# Patient Record
Sex: FEMALE | Race: WHITE | Employment: OTHER | URBAN - METROPOLITAN AREA
[De-identification: names, ages, dates, MRNs, and addresses within clinical notes are randomized per-mention and may not be internally consistent; named-entity substitution may affect disease eponyms.]

---

## 2019-09-06 ENCOUNTER — TELEPHONE (OUTPATIENT)
Dept: INTERNAL MEDICINE CLINIC | Facility: CLINIC | Age: 79
End: 2019-09-06

## 2019-09-06 PROBLEM — F41.9 ANXIETY: Status: ACTIVE | Noted: 2019-09-06

## 2019-09-06 PROBLEM — I10 ESSENTIAL HYPERTENSION: Status: ACTIVE | Noted: 2019-09-06

## 2019-09-06 PROBLEM — E78.00 HYPERCHOLESTEROLEMIA: Status: ACTIVE | Noted: 2019-09-06

## 2019-09-06 NOTE — TELEPHONE ENCOUNTER
Request for previous medical records hippa completed and mailed:    Dr Jayna Tavarez [address][id number]  [address].  [address]

## 2019-09-06 NOTE — PROGRESS NOTES
HPI:    Patient ID: Lila Humphreys is a 70year old female. HPI she is here to establish care  With new physician. According to  Her , her blood pressure lately is high.   According to her, her   was diagnosed in May with B-cell lymphoma and [facility] Oral Tab  Disp:  Rfl:    hydrochlorothiazide 12.5 MG Oral Tab  Disp:  Rfl:    ezetimibe 10 MG Oral Tab  Disp:  Rfl:      Allergies:Not on File    HISTORY:  No past medical history on file. No past surgical history on file.    No family wheezes. She has no rales. She exhibits no tenderness. Abdominal: Soft. Bowel sounds are normal. She exhibits no shifting dullness, no distension and no mass. There is no hepatosplenomegaly. There is no tenderness.  There is no rigidity, no rebound, no gu

## 2019-09-06 NOTE — PATIENT INSTRUCTIONS
Hypertension not well controlled will continue with losartan increase HCTZ to 25 mg daily check blood pressure at home and call with blood pressure readings on Tuesday nurse visit on Friday     anxiety /grief -started on a low-dose of citalopram and referr

## 2019-09-06 NOTE — TELEPHONE ENCOUNTER
Pt's daughter placed pt on the phone. Verified pt name and . Pt states she is very stressed out now due to spouse's health. Pt crying a lot, worrying a lot, trouble sleeping.     Appt scheduled today with Dr. Mendel Jacobus in [facility] as pt asking for

## 2019-09-10 ENCOUNTER — TELEPHONE (OUTPATIENT)
Dept: INTERNAL MEDICINE CLINIC | Facility: CLINIC | Age: 79
End: 2019-09-10

## 2019-09-10 NOTE — TELEPHONE ENCOUNTER
Per your request, patient calling with her bp readings:    9/7 1:30 pm      146/76  HR 72  9/8 2:00 pm      145/77 HR 87  9/9 1:50 pm 131/71 HR 83  9/10 11:30 am 133/73 HR 80

## 2019-09-10 NOTE — TELEPHONE ENCOUNTER
Patient contacted, appt made, patient will bring her machine with her for verification of the readings

## 2019-09-10 NOTE — TELEPHONE ENCOUNTER
Is better controlled and will suggest her to continue with her hydrochlorothiazide 25 mg daily check blood pressure once a day and make sure she comes for a nurse visit on Friday to check blood pressure at the office.

## 2019-09-13 ENCOUNTER — NURSE ONLY (OUTPATIENT)
Dept: INTERNAL MEDICINE CLINIC | Facility: CLINIC | Age: 79
End: 2019-09-13
Payer: MEDICARE

## 2019-09-13 VITALS — DIASTOLIC BLOOD PRESSURE: 78 MMHG | HEART RATE: 66 BPM | SYSTOLIC BLOOD PRESSURE: 128 MMHG

## 2019-09-13 DIAGNOSIS — Z01.30 BLOOD PRESSURE CHECK: Primary | ICD-10-CM

## 2019-09-19 ENCOUNTER — TELEPHONE (OUTPATIENT)
Dept: INTERNAL MEDICINE CLINIC | Facility: CLINIC | Age: 79
End: 2019-09-19

## 2019-09-19 NOTE — TELEPHONE ENCOUNTER
Patient states not happy with Escitalopram as it has caused dry mouth and \"I just want to cry, I can't handle this medication, and I want to go back to 12.5 mg of Hydrochlorothiazide because 25 mg is too much and I'm always going to the bathroom. \"    Please advise.

## 2019-09-19 NOTE — TELEPHONE ENCOUNTER
If her blood pressure is high , she needs hctz 25 or we can add something else. If she doesn't want to take citalopram she can stop the medication and  I will refer her to [address] [place] [address] if she agrees .  Is she willing to try different medication? pls let me know

## 2019-09-19 NOTE — TELEPHONE ENCOUNTER
Spoke with the patient and informed her of Dr. Ziyad Urrutia message from below. Patient reports she does not want to stay on the HCTZ 25mg and wants to go back to the HCTZ 12.5mg. Patient also reports she will stop the citalopram. She reports she is also working on setting up an appointment to see a counselor at the institution Dr. Sandra Sage recommended. Patient is requesting to speak with Dr. Sandra Sage regarding the plan of care. Routed to provider.

## 2019-09-19 NOTE — TELEPHONE ENCOUNTER
Patient calling again regarding hctz. Agreed to now take the 25 mg dose. Advised patient to take for one week, monitor her symptoms (dry mouth main concern) and call us with readings in 7 days to determine if the symptoms are manageable. She has stopped ecitalopram and is awaiting a call back from Javi Garcia.

## 2019-09-19 NOTE — TELEPHONE ENCOUNTER
Pt stated that the medications that Dr. El Barrera prescribed her are too strong.    -hydrochlorothiazide 25 MG Oral Tab  -escitalopram 5 MG Oral Tab    Pt is experiencing dry mouth and frequent urination, which causes her not to sleep. Pt would like to speak to Triage. Transferring call to Triage.

## 2019-10-08 ENCOUNTER — OFFICE VISIT (OUTPATIENT)
Dept: INTERNAL MEDICINE CLINIC | Facility: CLINIC | Age: 79
End: 2019-10-08
Payer: MEDICARE

## 2019-10-08 VITALS
SYSTOLIC BLOOD PRESSURE: 138 MMHG | HEIGHT: 61 IN | DIASTOLIC BLOOD PRESSURE: 85 MMHG | WEIGHT: 169 LBS | RESPIRATION RATE: 18 BRPM | BODY MASS INDEX: 31.91 KG/M2 | TEMPERATURE: 98 F | HEART RATE: 74 BPM

## 2019-10-08 DIAGNOSIS — I10 HTN (HYPERTENSION), BENIGN: Primary | ICD-10-CM

## 2019-10-08 PROCEDURE — 99214 OFFICE O/P EST MOD 30 MIN: CPT | Performed by: INTERNAL MEDICINE

## 2019-10-08 PROCEDURE — G0008 ADMIN INFLUENZA VIRUS VAC: HCPCS | Performed by: INTERNAL MEDICINE

## 2019-10-08 PROCEDURE — 90662 IIV NO PRSV INCREASED AG IM: CPT | Performed by: INTERNAL MEDICINE

## 2019-10-08 NOTE — PATIENT INSTRUCTIONS
Hypertension controlled advised to continue with current medication check blood pressure at home and bring logbook next visit watch diet avoid salty food.     Anxiety -discussed how to cope with daily stress she is following with therapist we can hold off o

## 2019-10-08 NOTE — PROGRESS NOTES
HPI:    Patient ID: Lila Humphreys is a 70year old female. HPI     She is here for follow up   She is checking her blood at home and according  t o her is around 130 /80. She is taking olmesartan 40 mg daily and hctz 25 mg .  She is watching her diet and Rfl:    simvastatin 40 MG Oral Tab  Disp:  Rfl:    ezetimibe 10 MG Oral Tab  Disp:  Rfl:    hydrochlorothiazide 25 MG Oral Tab Take 1 tablet (25 mg total) by mouth daily.  Disp: 90 tablet Rfl: 0   escitalopram 5 MG Oral Tab Take 1 tablet (5 mg total) by henry friction rub. No murmur heard. Pulmonary/Chest: Effort normal and breath sounds normal. No accessory muscle usage. No respiratory distress. She has no wheezes. She has no rales. She exhibits no tenderness. Abdominal: Soft.  Bowel sounds are normal. She

## 2019-11-18 RX ORDER — OLMESARTAN MEDOXOMIL 40 MG/1
40 TABLET ORAL DAILY
Qty: 90 TABLET | Refills: 1 | Status: SHIPPED | OUTPATIENT
Start: 2019-11-18 | End: 2020-05-13

## 2019-11-18 RX ORDER — HYDROCHLOROTHIAZIDE 25 MG/1
25 TABLET ORAL DAILY
Qty: 90 TABLET | Refills: 1 | Status: SHIPPED | OUTPATIENT
Start: 2019-11-18 | End: 2020-05-20

## 2019-11-18 NOTE — TELEPHONE ENCOUNTER
Please Review;protocol failed. Olmesartan hs not been prescribed by you.  Please Advise   Hypertensive Medications  Protocol Criteria:  · Appointment scheduled in the past 6 months or in the next 3 months  · BMP or CMP in the past 12 months  · Creatinine re

## 2019-12-02 RX ORDER — ESCITALOPRAM OXALATE 5 MG/1
TABLET ORAL
Qty: 90 TABLET | Refills: 1 | Status: SHIPPED | OUTPATIENT
Start: 2019-12-02 | End: 2020-01-14

## 2020-01-14 ENCOUNTER — OFFICE VISIT (OUTPATIENT)
Dept: INTERNAL MEDICINE CLINIC | Facility: CLINIC | Age: 80
End: 2020-01-14
Payer: MEDICARE

## 2020-01-14 VITALS
SYSTOLIC BLOOD PRESSURE: 138 MMHG | DIASTOLIC BLOOD PRESSURE: 82 MMHG | BODY MASS INDEX: 31.91 KG/M2 | RESPIRATION RATE: 18 BRPM | WEIGHT: 169 LBS | HEART RATE: 82 BPM | TEMPERATURE: 99 F | HEIGHT: 61 IN

## 2020-01-14 DIAGNOSIS — I10 ESSENTIAL HYPERTENSION: Primary | ICD-10-CM

## 2020-01-14 PROCEDURE — 99213 OFFICE O/P EST LOW 20 MIN: CPT | Performed by: INTERNAL MEDICINE

## 2020-01-14 NOTE — PATIENT INSTRUCTIONS
htn - controlled , advised her to check bp at home and bring log book next visit , watch diet , avoid salty food ,    anxiety - stable - continue to follow up with therapist

## 2020-01-14 NOTE — PROGRESS NOTES
HPI:    Patient ID: Sherice Delgado is a 70year old female. HPI She is here today for follow up  On her htn    She is not checking her bp at home and home  But she is taking her medication regularly .  She states that she was not very complaint with her [facility] Dispense Refill   • ESCITALOPRAM 5 MG Oral Tab TAKE ONE TABLET BY MOUTH DAILY 90 tablet 1   • hydrochlorothiazide 25 MG Oral Tab Take 1 tablet (25 mg total) by mouth daily.  90 tablet 1   • Olmesartan Medoxomil 40 MG Oral Tab Take 1 tablet (40 mg total) by and intact distal pulses. Exam reveals no gallop and no friction rub. No murmur heard. Pulmonary/Chest: Effort normal and breath sounds normal. No accessory muscle usage. No respiratory distress. She has no wheezes. She has no rales.  She exhibits no ten

## 2020-05-13 RX ORDER — OLMESARTAN MEDOXOMIL 40 MG/1
TABLET ORAL
Qty: 90 TABLET | Refills: 0 | Status: SHIPPED | OUTPATIENT
Start: 2020-05-13 | End: 2020-08-09

## 2020-05-20 RX ORDER — HYDROCHLOROTHIAZIDE 25 MG/1
TABLET ORAL
Qty: 90 TABLET | Refills: 0 | Status: SHIPPED | OUTPATIENT
Start: 2020-05-20 | End: 2020-08-11

## 2020-08-09 RX ORDER — OLMESARTAN MEDOXOMIL 40 MG/1
TABLET ORAL
Qty: 90 TABLET | Refills: 0 | Status: SHIPPED | OUTPATIENT
Start: 2020-08-09 | End: 2020-08-11

## 2020-08-11 ENCOUNTER — OFFICE VISIT (OUTPATIENT)
Dept: INTERNAL MEDICINE CLINIC | Facility: CLINIC | Age: 80
End: 2020-08-11
Payer: MEDICARE

## 2020-08-11 VITALS
HEART RATE: 86 BPM | BODY MASS INDEX: 32.47 KG/M2 | OXYGEN SATURATION: 96 % | TEMPERATURE: 98 F | DIASTOLIC BLOOD PRESSURE: 85 MMHG | WEIGHT: 172 LBS | SYSTOLIC BLOOD PRESSURE: 138 MMHG | HEIGHT: 61 IN

## 2020-08-11 DIAGNOSIS — Z00.00 ENCOUNTER FOR ANNUAL HEALTH EXAMINATION: ICD-10-CM

## 2020-08-11 DIAGNOSIS — Z00.00 MEDICARE ANNUAL WELLNESS VISIT, SUBSEQUENT: ICD-10-CM

## 2020-08-11 DIAGNOSIS — Z78.0 MENOPAUSE: Primary | ICD-10-CM

## 2020-08-11 PROBLEM — N39.490 OVERFLOW INCONTINENCE OF URINE: Status: ACTIVE | Noted: 2020-08-11

## 2020-08-11 LAB
ALBUMIN SERPL-MCNC: 4 G/DL (ref 3.4–5)
ALBUMIN/GLOB SERPL: 1 {RATIO} (ref 1–2)
ALP LIVER SERPL-CCNC: 82 U/L (ref 55–142)
ALT SERPL-CCNC: 22 U/L (ref 13–56)
ANION GAP SERPL CALC-SCNC: 6 MMOL/L (ref 0–18)
AST SERPL-CCNC: 15 U/L (ref 15–37)
BASOPHILS # BLD AUTO: 0.05 X10(3) UL (ref 0–0.2)
BASOPHILS NFR BLD AUTO: 0.8 %
BILIRUB SERPL-MCNC: 0.4 MG/DL (ref 0.1–2)
BUN BLD-MCNC: 23 MG/DL (ref 7–18)
BUN/CREAT SERPL: 23 (ref 10–20)
CALCIUM BLD-MCNC: 9.6 MG/DL (ref 8.5–10.1)
CHLORIDE SERPL-SCNC: 106 MMOL/L (ref 98–112)
CHOLEST SMN-MCNC: 191 MG/DL (ref ?–200)
CO2 SERPL-SCNC: 28 MMOL/L (ref 21–32)
CREAT BLD-MCNC: 1 MG/DL (ref 0.55–1.02)
DEPRECATED RDW RBC AUTO: 44.9 FL (ref 35.1–46.3)
EOSINOPHIL # BLD AUTO: 0.12 X10(3) UL (ref 0–0.7)
EOSINOPHIL NFR BLD AUTO: 1.9 %
ERYTHROCYTE [DISTWIDTH] IN BLOOD BY AUTOMATED COUNT: 13.4 % (ref 11–15)
EST. AVERAGE GLUCOSE BLD GHB EST-MCNC: 117 MG/DL (ref 68–126)
GLOBULIN PLAS-MCNC: 3.9 G/DL (ref 2.8–4.4)
GLUCOSE BLD-MCNC: 85 MG/DL (ref 70–99)
HBA1C MFR BLD HPLC: 5.7 % (ref ?–5.7)
HCT VFR BLD AUTO: 41.4 % (ref 35–48)
HDLC SERPL-MCNC: 73 MG/DL (ref 40–59)
HGB BLD-MCNC: 13.2 G/DL (ref 12–16)
IMM GRANULOCYTES # BLD AUTO: 0.01 X10(3) UL (ref 0–1)
IMM GRANULOCYTES NFR BLD: 0.2 %
LDLC SERPL CALC-MCNC: 92 MG/DL (ref ?–100)
LYMPHOCYTES # BLD AUTO: 1.24 X10(3) UL (ref 1–4)
LYMPHOCYTES NFR BLD AUTO: 19.7 %
M PROTEIN MFR SERPL ELPH: 7.9 G/DL (ref 6.4–8.2)
MCH RBC QN AUTO: 28.9 PG (ref 26–34)
MCHC RBC AUTO-ENTMCNC: 31.9 G/DL (ref 31–37)
MCV RBC AUTO: 90.8 FL (ref 80–100)
MONOCYTES # BLD AUTO: 0.37 X10(3) UL (ref 0.1–1)
MONOCYTES NFR BLD AUTO: 5.9 %
NEUTROPHILS # BLD AUTO: 4.5 X10 (3) UL (ref 1.5–7.7)
NEUTROPHILS # BLD AUTO: 4.5 X10(3) UL (ref 1.5–7.7)
NEUTROPHILS NFR BLD AUTO: 71.5 %
NONHDLC SERPL-MCNC: 118 MG/DL (ref ?–130)
OSMOLALITY SERPL CALC.SUM OF ELEC: 293 MOSM/KG (ref 275–295)
PATIENT FASTING Y/N/NP: YES
PATIENT FASTING Y/N/NP: YES
PLATELET # BLD AUTO: 195 10(3)UL (ref 150–450)
POTASSIUM SERPL-SCNC: 4.1 MMOL/L (ref 3.5–5.1)
RBC # BLD AUTO: 4.56 X10(6)UL (ref 3.8–5.3)
SODIUM SERPL-SCNC: 140 MMOL/L (ref 136–145)
T4 FREE SERPL-MCNC: 1 NG/DL (ref 0.8–1.7)
TRIGL SERPL-MCNC: 128 MG/DL (ref 30–149)
TSI SER-ACNC: 2.8 MIU/ML (ref 0.36–3.74)
VLDLC SERPL CALC-MCNC: 26 MG/DL (ref 0–30)
WBC # BLD AUTO: 6.3 X10(3) UL (ref 4–11)

## 2020-08-11 PROCEDURE — G0439 PPPS, SUBSEQ VISIT: HCPCS | Performed by: INTERNAL MEDICINE

## 2020-08-11 PROCEDURE — 36415 COLL VENOUS BLD VENIPUNCTURE: CPT | Performed by: INTERNAL MEDICINE

## 2020-08-11 RX ORDER — HYDROCHLOROTHIAZIDE 25 MG/1
25 TABLET ORAL DAILY
Qty: 90 TABLET | Refills: 0 | Status: SHIPPED | OUTPATIENT
Start: 2020-08-11 | End: 2020-08-17

## 2020-08-11 RX ORDER — SIMVASTATIN 40 MG
40 TABLET ORAL NIGHTLY
Qty: 90 TABLET | Refills: 1 | Status: SHIPPED | OUTPATIENT
Start: 2020-08-11 | End: 2021-02-03

## 2020-08-11 RX ORDER — OLMESARTAN MEDOXOMIL 40 MG/1
40 TABLET ORAL DAILY
Qty: 90 TABLET | Refills: 0 | Status: SHIPPED | OUTPATIENT
Start: 2020-08-11 | End: 2021-02-01

## 2020-08-11 RX ORDER — OXYBUTYNIN CHLORIDE 5 MG/1
5 TABLET ORAL NIGHTLY
Qty: 90 TABLET | Refills: 0 | Status: SHIPPED | OUTPATIENT
Start: 2020-08-11 | End: 2020-11-06

## 2020-08-11 RX ORDER — EZETIMIBE 10 MG/1
10 TABLET ORAL NIGHTLY
Qty: 90 TABLET | Refills: 0 | Status: SHIPPED | OUTPATIENT
Start: 2020-08-11 | End: 2021-01-01

## 2020-08-11 NOTE — PROGRESS NOTES
HPI:   Simone Joy is a 68year old female who presents for a Medicare Subsequent Annual Wellness visit (Pt already had Initial Annual Wellness).       Her last annual assessment has been over 1 year: Annual Physical due on 03/15/1943         Fall/Risk Ass found for: WBC, HGB, PLT     ALLERGIES:   She has no allergies on file.     CURRENT MEDICATIONS:   Esomeprazole Magnesium 20 MG Oral Capsule Delayed Release, Take 20 mg by mouth every morning before breakfast.  OLMESARTAN MEDOXOMIL 40 MG Oral Tab, TAKE ONE following the conversations when two or more people are talking at the same time:  No   I have trouble understanding things on the TV:  No I have to strain to understand conversations:  No   I have to worry about missing the telephone ring or doorbell:  No right side and 2+ on the left side. Popliteal pulses are 2+ on the right side and 2+ on the left side. Dorsalis pedis pulses are 2+ on the right side and 2+ on the left side.         Posterior tibial pulses are 2+ on the right side and 2+ on th indicates understanding of these issues and agrees to the plan. Reinforced healthy diet, lifestyle, and exercise. No follow-ups on file.      Scotty Vilchis MD, 8/11/2020     General Health     In the past six months, have you lost more than 10 pounds w for this patient. Update Health Maintenance if applicable    Chlamydia  Annually if high risk No results found for: CHLAMYDIA No flowsheet data found.     Screening Mammogram      Mammogram  Annually to 78, then as discussed There are no preventive care rem

## 2020-08-11 NOTE — PATIENT INSTRUCTIONS
Camryn Ortiz SCREENING SCHEDULE   Tests on this list are recommended by your physician but may not be covered, or covered at this frequency, by your insurer. Please check with your insurance carrier before scheduling to verify coverage.    PREVENTATIVE Covered every 5 years No results found for this or any previous visit. No flowsheet data found. Fecal Occult Blood   Covered Annually No results found for: FOB, OCCULTSTOOL No flowsheet data found.      Barium Enema-   uncomfortable but covered  Covered get once after your 65th birthday    Hepatitis B for Moderate/High Risk       No orders found for this or any previous visit.  Medium/high risk factors:   End-stage renal disease   Hemophiliacs who received Factor VIII or IX concentrates   Clients of instit

## 2020-08-17 RX ORDER — HYDROCHLOROTHIAZIDE 25 MG/1
TABLET ORAL
Qty: 90 TABLET | Refills: 0 | Status: SHIPPED | OUTPATIENT
Start: 2020-08-17 | End: 2021-02-07

## 2020-09-08 ENCOUNTER — HOSPITAL ENCOUNTER (OUTPATIENT)
Dept: BONE DENSITY | Facility: HOSPITAL | Age: 80
Discharge: HOME | End: 2020-09-08
Attending: INTERNAL MEDICINE
Payer: MEDICARE

## 2020-09-08 DIAGNOSIS — Z78.0 MENOPAUSE: ICD-10-CM

## 2020-09-08 PROCEDURE — 77080 DXA BONE DENSITY AXIAL: CPT | Performed by: INTERNAL MEDICINE

## 2020-09-29 ENCOUNTER — OFFICE VISIT (OUTPATIENT)
Dept: INTERNAL MEDICINE CLINIC | Facility: CLINIC | Age: 80
End: 2020-09-29
Payer: MEDICARE

## 2020-09-29 VITALS
SYSTOLIC BLOOD PRESSURE: 150 MMHG | OXYGEN SATURATION: 94 % | DIASTOLIC BLOOD PRESSURE: 75 MMHG | BODY MASS INDEX: 35.19 KG/M2 | HEART RATE: 80 BPM | HEIGHT: 58 IN | WEIGHT: 167.63 LBS | TEMPERATURE: 98 F

## 2020-09-29 DIAGNOSIS — M54.40 CHRONIC LOW BACK PAIN WITH SCIATICA, SCIATICA LATERALITY UNSPECIFIED, UNSPECIFIED BACK PAIN LATERALITY: Primary | ICD-10-CM

## 2020-09-29 DIAGNOSIS — G89.29 CHRONIC LOW BACK PAIN WITH SCIATICA, SCIATICA LATERALITY UNSPECIFIED, UNSPECIFIED BACK PAIN LATERALITY: Primary | ICD-10-CM

## 2020-09-29 PROCEDURE — 99214 OFFICE O/P EST MOD 30 MIN: CPT | Performed by: INTERNAL MEDICINE

## 2020-09-29 RX ORDER — BENAZEPRIL HYDROCHLORIDE 20 MG/1
TABLET ORAL
COMMUNITY
End: 2020-09-29

## 2020-09-29 RX ORDER — LACTOBACILLUS ACIDOPHILUS
CAPSULE ORAL DAILY
COMMUNITY
End: 2020-09-29

## 2020-09-29 RX ORDER — METHYLPREDNISOLONE 4 MG/1
TABLET ORAL
Qty: 1 KIT | Refills: 0 | Status: SHIPPED | OUTPATIENT
Start: 2020-09-29 | End: 2020-10-06

## 2020-09-29 NOTE — PATIENT INSTRUCTIONS
Chronic low back pain with sciatica, sciatica laterality unspecified, unspecified back pain laterality  (primary encounter diagnosis)Correct lifting with legs and not with back. Turn body completely to lift something from side. Back exercises.  Correct post

## 2020-09-29 NOTE — PROGRESS NOTES
HPI:    Patient ID: Edmund Kwong is a 68year old female. HPI she came today complaining of lower back pain.   According to her the pain is ongoing on and off for some time but for the last 2 weeks she does have some lower back pain is worse with movemen Ascorbic Acid (VITAMIN C OR) Take by mouth. • Ergocalciferol (VITAMIN D OR) Take by mouth. • methylPREDNISolone (MEDROL) 4 MG Oral Tablet Therapy Pack As directed.  1 kit 0   • HYDROCHLOROTHIAZIDE 25 MG Oral Tab TAKE ONE TABLET BY MOUTH DAILY 90 tab Pupils are equal, round, and reactive to light. Conjunctivae and EOM are normal. Right eye exhibits no discharge. Left eye exhibits no discharge. No scleral icterus. Neck: Normal range of motion. Neck supple. No JVD present.  No tracheal tenderness presen with feet flat on floor and back against chair and computer at eye level.   We will start her on Medrol Dosepak advised to take with food, order x-ray of lower spine back continue with heating packs if not better FOLLOW UP     Hypertension blood pressure no

## 2020-09-30 ENCOUNTER — HOSPITAL ENCOUNTER (OUTPATIENT)
Dept: GENERAL RADIOLOGY | Facility: HOSPITAL | Age: 80
Discharge: HOME | End: 2020-09-30
Attending: INTERNAL MEDICINE
Payer: MEDICARE

## 2020-09-30 DIAGNOSIS — G89.29 CHRONIC LOW BACK PAIN WITH SCIATICA, SCIATICA LATERALITY UNSPECIFIED, UNSPECIFIED BACK PAIN LATERALITY: ICD-10-CM

## 2020-09-30 DIAGNOSIS — M54.40 CHRONIC LOW BACK PAIN WITH SCIATICA, SCIATICA LATERALITY UNSPECIFIED, UNSPECIFIED BACK PAIN LATERALITY: ICD-10-CM

## 2020-09-30 PROCEDURE — 72110 X-RAY EXAM L-2 SPINE 4/>VWS: CPT | Performed by: INTERNAL MEDICINE

## 2020-10-06 ENCOUNTER — OFFICE VISIT (OUTPATIENT)
Dept: INTERNAL MEDICINE CLINIC | Facility: CLINIC | Age: 80
End: 2020-10-06
Payer: MEDICARE

## 2020-10-06 VITALS
BODY MASS INDEX: 34.93 KG/M2 | WEIGHT: 166.38 LBS | TEMPERATURE: 97 F | OXYGEN SATURATION: 96 % | DIASTOLIC BLOOD PRESSURE: 85 MMHG | HEART RATE: 76 BPM | HEIGHT: 58 IN | SYSTOLIC BLOOD PRESSURE: 135 MMHG

## 2020-10-06 DIAGNOSIS — G89.29 CHRONIC RADICULAR PAIN OF LOWER BACK: Primary | ICD-10-CM

## 2020-10-06 DIAGNOSIS — M54.16 CHRONIC RADICULAR PAIN OF LOWER BACK: Primary | ICD-10-CM

## 2020-10-06 PROCEDURE — G0008 ADMIN INFLUENZA VIRUS VAC: HCPCS | Performed by: INTERNAL MEDICINE

## 2020-10-06 PROCEDURE — 90662 IIV NO PRSV INCREASED AG IM: CPT | Performed by: INTERNAL MEDICINE

## 2020-10-06 PROCEDURE — 99213 OFFICE O/P EST LOW 20 MIN: CPT | Performed by: INTERNAL MEDICINE

## 2020-10-06 NOTE — PROGRESS NOTES
HPI:    Patient ID: Sherice Delgado is a 68year old female. HPI     She came in today for follow-up on her lower back pain. She states that she finished Medrol Dosepak.   She is feeling much better she still has on and off some pain but not as bad as befo mouth.     • HYDROCHLOROTHIAZIDE 25 MG Oral Tab TAKE ONE TABLET BY MOUTH DAILY 90 tablet 0   • Esomeprazole Magnesium 20 MG Oral Capsule Delayed Release Take 20 mg by mouth every morning before breakfast.     • ezetimibe 10 MG Oral Tab Take 1 tablet (10 mg Normal range of motion. Neck supple. No JVD present. No tracheal tenderness present. No tracheal deviation present. No thyroid mass and no thyromegaly present. Cardiovascular: Normal rate, regular rhythm, normal heart sounds and intact distal pulses.  Exa prescriptions requested or ordered in this encounter       Imaging & Referrals:  FLU VACC HIGH DOSE PRSV FREE        AM#[id number]

## 2020-10-08 ENCOUNTER — OFFICE VISIT (OUTPATIENT)
Dept: PHYSICAL THERAPY | Facility: HOSPITAL | Age: 80
End: 2020-10-08
Attending: INTERNAL MEDICINE
Payer: MEDICARE

## 2020-10-08 DIAGNOSIS — M54.40 ACUTE RIGHT-SIDED LOW BACK PAIN WITH SCIATICA, SCIATICA LATERALITY UNSPECIFIED: ICD-10-CM

## 2020-10-08 PROCEDURE — 97140 MANUAL THERAPY 1/> REGIONS: CPT

## 2020-10-08 PROCEDURE — 97161 PT EVAL LOW COMPLEX 20 MIN: CPT

## 2020-10-08 PROCEDURE — 97110 THERAPEUTIC EXERCISES: CPT

## 2020-10-08 NOTE — PROGRESS NOTES
SPINE EVALUATION:   Referring Physician: Dr. Nicole Miller  Diagnosis: Acute right-sided low back pain with sciatica, sciatica laterality unspecified (M54.40)     Date of Service: 10/8/2020     PATIENT SUMMARY   Alta Romo is a 68year old female who presents t include knowing what she can and can't do, be able to walk and decrease pain with sitting. Past medical history was reviewed with Karolina Tam. No medical history on file. Pt denies h/o CA or diabetes.  She does have HTN, however well-controlled with BP medicat thoracolumbar paraspinals, more on the L; trigger point and tenderness on L rhomboids which reproduced pain     Strength: (* denotes performed with pain)  LE   Hip flexion (L2): R 4/5; L 4/5  Knee Flexion: R 5/5; L 5/5   Knee extension (L3): R 5/5; L 5/5 improve function. Pt will demonstrate decreased pain to <2/10 during functional tasks and ADL's. Pt will be able to sit for > 30 mins with min to no increased pain. Pt will be able to walk 20 mins without increased pain.    Pt will be able to perform

## 2020-10-13 ENCOUNTER — OFFICE VISIT (OUTPATIENT)
Dept: PHYSICAL THERAPY | Facility: HOSPITAL | Age: 80
End: 2020-10-13
Attending: INTERNAL MEDICINE
Payer: MEDICARE

## 2020-10-13 DIAGNOSIS — M54.40 ACUTE RIGHT-SIDED LOW BACK PAIN WITH SCIATICA, SCIATICA LATERALITY UNSPECIFIED: ICD-10-CM

## 2020-10-13 PROCEDURE — 97140 MANUAL THERAPY 1/> REGIONS: CPT

## 2020-10-13 PROCEDURE — 97112 NEUROMUSCULAR REEDUCATION: CPT

## 2020-10-13 NOTE — PROGRESS NOTES
Dx: Acute right-sided low back pain with sciatica, sciatica laterality unspecified (M54.40)           Insurance (Authorized # of Visits):  [address] [facility],[address] (10)            Authorizing Physician: Dr. Leora Mcwilliams  Next MD visit: Nov 17  Fall Risk: standard         Precautions L scapular region and improved stabilization   Date: 10/13/2020  TX#: 2/10 Date:                 TX#: 3/ Date:                 TX#: 4/ Date:                 TX#: 5/ Date:    Tx#: 6/   Manual:   STM/DTM to med scap - rhomboids and paraspinals   Scap mobiliza

## 2020-10-16 ENCOUNTER — OFFICE VISIT (OUTPATIENT)
Dept: PHYSICAL THERAPY | Facility: HOSPITAL | Age: 80
End: 2020-10-16
Attending: INTERNAL MEDICINE
Payer: MEDICARE

## 2020-10-16 DIAGNOSIS — M54.40 ACUTE RIGHT-SIDED LOW BACK PAIN WITH SCIATICA, SCIATICA LATERALITY UNSPECIFIED: ICD-10-CM

## 2020-10-16 PROCEDURE — 97110 THERAPEUTIC EXERCISES: CPT

## 2020-10-16 PROCEDURE — 97140 MANUAL THERAPY 1/> REGIONS: CPT

## 2020-10-16 PROCEDURE — 97112 NEUROMUSCULAR REEDUCATION: CPT

## 2020-10-16 NOTE — PROGRESS NOTES
Dx: Acute right-sided low back pain with sciatica, sciatica laterality unspecified (M54.40)           Insurance (Authorized # of Visits):  [address] [facility],[address] (10)            Authorizing Physician: Dr. Luis Bruno  Next MD visit: Nov 17  Fall Risk: standard         Precautions mobilization on the L  Manual:   STM/DTM to med scap - rhomboids and paraspinals   Scap mobilization on the L      TherEx:  Nustep, level 5 - 5 mins TherEx:  Nustep, level 5 - 5 mins  Rhomboid stretch seated - 2x10\" - held   Cross body stretch on the L -

## 2020-10-20 ENCOUNTER — OFFICE VISIT (OUTPATIENT)
Dept: PHYSICAL THERAPY | Facility: HOSPITAL | Age: 80
End: 2020-10-20
Attending: INTERNAL MEDICINE
Payer: MEDICARE

## 2020-10-20 DIAGNOSIS — M54.40 ACUTE RIGHT-SIDED LOW BACK PAIN WITH SCIATICA, SCIATICA LATERALITY UNSPECIFIED: ICD-10-CM

## 2020-10-20 PROCEDURE — 97110 THERAPEUTIC EXERCISES: CPT

## 2020-10-20 PROCEDURE — 97140 MANUAL THERAPY 1/> REGIONS: CPT

## 2020-10-20 PROCEDURE — 97112 NEUROMUSCULAR REEDUCATION: CPT

## 2020-10-20 NOTE — PROGRESS NOTES
Dx: Acute right-sided low back pain with sciatica, sciatica laterality unspecified (M54.40)           Insurance (Authorized # of Visits):  [address] [facility],[address] (10)            Authorizing Physician: Dr. Jason Ayala MD visit: Nov 17  Fall Risk: standard         Precautions rhomboids and paraspinals   Scap mobilization on the L     TherEx:  Nustep, level 5 - 5 mins TherEx:  Nustep, level 5 - 5 mins  Rhomboid stretch seated - 2x10\" - held   Cross body stretch on the L - 2x20\"   TherEx:  Nustep, level 5 - 5 mins  Seated thora

## 2020-10-23 ENCOUNTER — OFFICE VISIT (OUTPATIENT)
Dept: PHYSICAL THERAPY | Facility: HOSPITAL | Age: 80
End: 2020-10-23
Attending: INTERNAL MEDICINE
Payer: MEDICARE

## 2020-10-23 DIAGNOSIS — M54.40 ACUTE RIGHT-SIDED LOW BACK PAIN WITH SCIATICA, SCIATICA LATERALITY UNSPECIFIED: ICD-10-CM

## 2020-10-23 PROCEDURE — 97110 THERAPEUTIC EXERCISES: CPT

## 2020-10-23 PROCEDURE — 97140 MANUAL THERAPY 1/> REGIONS: CPT

## 2020-10-23 NOTE — PROGRESS NOTES
Dx: Acute right-sided low back pain with sciatica, sciatica laterality unspecified (M54.40)           Insurance (Authorized # of Visits):  [address] [facility],[address] (10)            Authorizing Physician: Dr. Blanca Ayala MD visit: Nov 17  Fall Risk: standard         Precautions decrease muscle guarding around L scapular region and improved stabilization   Date: 10/13/2020  TX#: 2/10 Date: 10/16/2020            TX#: 3/10 Date: 10/20/2020           TX#: 4/10 Date: 10/23/2020           TX#: 5/10   Manual:   RUPA/BLANKA to med scap - rho

## 2020-10-26 ENCOUNTER — OFFICE VISIT (OUTPATIENT)
Dept: PHYSICAL THERAPY | Facility: HOSPITAL | Age: 80
End: 2020-10-26
Attending: INTERNAL MEDICINE
Payer: MEDICARE

## 2020-10-26 DIAGNOSIS — M54.40 ACUTE RIGHT-SIDED LOW BACK PAIN WITH SCIATICA, SCIATICA LATERALITY UNSPECIFIED: ICD-10-CM

## 2020-10-26 PROCEDURE — 97110 THERAPEUTIC EXERCISES: CPT

## 2020-10-26 PROCEDURE — 97140 MANUAL THERAPY 1/> REGIONS: CPT

## 2020-10-26 PROCEDURE — 97112 NEUROMUSCULAR REEDUCATION: CPT

## 2020-10-26 NOTE — PROGRESS NOTES
Dx: Acute right-sided low back pain with sciatica, sciatica laterality unspecified (M54.40)           Insurance (Authorized # of Visits):  [address] [facility],[address] (10)            Authorizing Physician: Dr. Ben Ayala MD visit: Nov 17  Fall Risk: standard         Precautions chores to avoid increase in pain.      Plan: Cont PT for thoracolumbar spine ROM, manual therapy to decrease muscle guarding around L scapular region and improved stabilization   Date: 10/16/2020            TX#: 3/10 Date: 10/20/2020           TX#: 4/10 Heriberto stabilization - 2x10   Rows with green theraband - 15    HEP: chin tucks, scap squeeze, TrA contraction and PPT     Charges: Manual - 1, TherEx - 1, Neuro Krystal - 1          Total Timed Treatment: 40 min  Total Treatment Time: 40 min

## 2020-10-29 ENCOUNTER — OFFICE VISIT (OUTPATIENT)
Dept: PHYSICAL THERAPY | Facility: HOSPITAL | Age: 80
End: 2020-10-29
Attending: INTERNAL MEDICINE
Payer: MEDICARE

## 2020-10-29 DIAGNOSIS — M54.40 ACUTE RIGHT-SIDED LOW BACK PAIN WITH SCIATICA, SCIATICA LATERALITY UNSPECIFIED: ICD-10-CM

## 2020-10-29 PROCEDURE — 97112 NEUROMUSCULAR REEDUCATION: CPT

## 2020-10-29 PROCEDURE — 97110 THERAPEUTIC EXERCISES: CPT

## 2020-10-29 PROCEDURE — 97140 MANUAL THERAPY 1/> REGIONS: CPT

## 2020-10-29 NOTE — PROGRESS NOTES
Dx: Acute right-sided low back pain with sciatica, sciatica laterality unspecified (M54.40)           Insurance (Authorized # of Visits):  [address] [facility],[address] (10)            Authorizing Physician: Dr. Richelle Ayala MD visit: Nov 17  Fall Risk: standard         Precautions TX#: 5/10 10/26/2020  Tx: 6/10 10/29/2020  Tx: 7/10   Manual:   STM/DTM to med scap - rhomboids and paraspinals   Scap mobilization on the L  Manual:   STM/DTM to med scap - rhomboids and paraspinals   Scap mobilization on the L  Manual:   STM/DTM to med Charges: Manual - 1, TherEx - 1, Neuro Krystal - 1          Total Timed Treatment: 45 min  Total Treatment Time: 45 min

## 2020-11-02 ENCOUNTER — OFFICE VISIT (OUTPATIENT)
Dept: PHYSICAL THERAPY | Facility: HOSPITAL | Age: 80
End: 2020-11-02
Attending: INTERNAL MEDICINE
Payer: MEDICARE

## 2020-11-02 DIAGNOSIS — M54.40 ACUTE RIGHT-SIDED LOW BACK PAIN WITH SCIATICA, SCIATICA LATERALITY UNSPECIFIED: ICD-10-CM

## 2020-11-02 PROCEDURE — 97140 MANUAL THERAPY 1/> REGIONS: CPT

## 2020-11-02 PROCEDURE — 97112 NEUROMUSCULAR REEDUCATION: CPT

## 2020-11-02 PROCEDURE — 97110 THERAPEUTIC EXERCISES: CPT

## 2020-11-02 NOTE — PROGRESS NOTES
Dx: Acute right-sided low back pain with sciatica, sciatica laterality unspecified (M54.40)           Insurance (Authorized # of Visits):  [address] [facility],[address] (10)            Authorizing Physician: Dr. Tamara Ayala MD visit: Nov 17  Fall Risk: standard         Precautions 10/26/2020  Tx: 6/10 10/29/2020  Tx: 7/10 11/2/2020  Tx: 8/10   Manual:   STM/DTM to med scap - rhomboids and paraspinals   Scap mobilization on the L  Manual:   STM/DTM to med scap - rhomboids and paraspinals   Scap mobilization on the L   DTM to lats B i theraband - 15  Low rows with green theraband - 15   B shoulder ER with red theraband and focus on core stabilization - 15    HEP: chin tucks, scap squeeze, TrA contraction and PPT     Charges: Manual - 1, TherEx - 1, Neuro Krystal - 1          Total Timed Tr

## 2020-11-05 ENCOUNTER — OFFICE VISIT (OUTPATIENT)
Dept: PHYSICAL THERAPY | Facility: HOSPITAL | Age: 80
End: 2020-11-05
Attending: INTERNAL MEDICINE
Payer: MEDICARE

## 2020-11-05 DIAGNOSIS — M54.40 ACUTE RIGHT-SIDED LOW BACK PAIN WITH SCIATICA, SCIATICA LATERALITY UNSPECIFIED: ICD-10-CM

## 2020-11-05 PROCEDURE — 97110 THERAPEUTIC EXERCISES: CPT

## 2020-11-05 PROCEDURE — 97112 NEUROMUSCULAR REEDUCATION: CPT

## 2020-11-05 PROCEDURE — 97140 MANUAL THERAPY 1/> REGIONS: CPT

## 2020-11-05 NOTE — PROGRESS NOTES
Dx: Acute right-sided low back pain with sciatica, sciatica laterality unspecified (M54.40)           Insurance (Authorized # of Visits):  [address] [facility],[address] (10)            Authorizing Physician: Dr. Alessandro Ayala MD visit: Nov 17  Fall Risk: standard         Precautions prone Manual:   STM/DTM to med scap - rhomboids and paraspinals   Scap mobilization on the L   DTM to lats B in prone Manual:   STM/DTM to med scap - rhomboids and paraspinals   Scap mobilization on the L   DTM to lats B in prone   TherEx:  Naga, level 5 1          Total Timed Treatment: 45 min  Total Treatment Time: 45 min

## 2020-11-06 RX ORDER — OXYBUTYNIN CHLORIDE 5 MG/1
TABLET ORAL
Qty: 90 TABLET | Refills: 0 | Status: SHIPPED | OUTPATIENT
Start: 2020-11-06 | End: 2021-05-18

## 2020-11-17 ENCOUNTER — OFFICE VISIT (OUTPATIENT)
Dept: PHYSICAL THERAPY | Facility: HOSPITAL | Age: 80
End: 2020-11-17
Attending: INTERNAL MEDICINE
Payer: MEDICARE

## 2020-11-17 ENCOUNTER — OFFICE VISIT (OUTPATIENT)
Dept: INTERNAL MEDICINE CLINIC | Facility: CLINIC | Age: 80
End: 2020-11-17
Payer: MEDICARE

## 2020-11-17 VITALS
WEIGHT: 165.19 LBS | BODY MASS INDEX: 34.21 KG/M2 | OXYGEN SATURATION: 97 % | DIASTOLIC BLOOD PRESSURE: 64 MMHG | SYSTOLIC BLOOD PRESSURE: 120 MMHG | TEMPERATURE: 98 F | HEART RATE: 76 BPM | HEIGHT: 58.25 IN

## 2020-11-17 DIAGNOSIS — M54.40 LOW BACK PAIN WITH SCIATICA, SCIATICA LATERALITY UNSPECIFIED, UNSPECIFIED BACK PAIN LATERALITY, UNSPECIFIED CHRONICITY: Primary | ICD-10-CM

## 2020-11-17 PROCEDURE — 97112 NEUROMUSCULAR REEDUCATION: CPT

## 2020-11-17 PROCEDURE — 97140 MANUAL THERAPY 1/> REGIONS: CPT

## 2020-11-17 PROCEDURE — 99214 OFFICE O/P EST MOD 30 MIN: CPT | Performed by: INTERNAL MEDICINE

## 2020-11-17 PROCEDURE — 97110 THERAPEUTIC EXERCISES: CPT

## 2020-11-17 NOTE — PROGRESS NOTES
Dx: Acute right-sided low back pain with sciatica, sciatica laterality unspecified (M54.40)           Insurance (Authorized # of Visits):  [address] [facility],[address] (10)            Authorizing Physician: Dr. Hawa Ayala MD visit: Nov 17  Fall Risk: standard         Precautions Manual:   STM/DTM to med scap - rhomboids and paraspinals   Scap mobilization on the L   DTM to lats B in prone Manual:   STM/DTM to med scap - rhomboids and paraspinals   Scap mobilization on the L   DTM to lats B in prone Manual:   STM/DTM to med scap on core stabilization - 15x3\"   Prone scap squeeze - 10x5\"  Prone w's - 10x3\" Neuro Krystal:  Mid rows with red theraband sitting - 2x15, 3\" holds   B shoulder ER with red theraband and focus on core stabilization in sitting - 2x15, 3\" holds      HEP: ch

## 2020-11-17 NOTE — PROGRESS NOTES
HPI:    Patient ID: Sol Ragland is a 68year old female. HPI She is here today for follow-up on her back pain. She states that time she is doing much better she is going for physical therapy she is already had cycle 8 session.   She is working on her p C OR) Take by mouth. • Ergocalciferol (VITAMIN D OR) Take by mouth.      • HYDROCHLOROTHIAZIDE 25 MG Oral Tab TAKE ONE TABLET BY MOUTH DAILY 90 tablet 0   • Esomeprazole Magnesium 20 MG Oral Capsule Delayed Release Take 20 mg by mouth every morning befo normal. Right eye exhibits no discharge. Left eye exhibits no discharge. No scleral icterus. Neck: Normal range of motion. Neck supple. No JVD present. No tracheal tenderness present. No tracheal deviation present.  No thyroid mass and no thyromegaly pres Imaging & Referrals:  None        #[id number]

## 2020-11-20 ENCOUNTER — OFFICE VISIT (OUTPATIENT)
Dept: PHYSICAL THERAPY | Facility: HOSPITAL | Age: 80
End: 2020-11-20
Attending: INTERNAL MEDICINE
Payer: MEDICARE

## 2020-11-20 PROCEDURE — 97112 NEUROMUSCULAR REEDUCATION: CPT

## 2020-11-20 PROCEDURE — 97110 THERAPEUTIC EXERCISES: CPT

## 2020-11-20 PROCEDURE — 97140 MANUAL THERAPY 1/> REGIONS: CPT

## 2020-11-20 NOTE — PROGRESS NOTES
Progress Summary  Pt has attended 11 visits in Physical Therapy.      Dx: Acute right-sided low back pain with sciatica, sciatica laterality unspecified (M54.40)           Insurance (Authorized # of Visits):  [address] [facility],[address] (10)            Authorizing Physician:  increased pain. - met  Pt will be able to walk 20 mins without increased pain. - met   Pt will be able to perform squat with proper mechanics ans well as demo proper mechanics during household chores to avoid increase in pain.  - not assessed this date TherEx:  Nustep, level 5 - 5 mins   Supine thoracic ext stretch with arms out to sides - 10x10\"  Seated thoracic rotation stretch - 5x10\" B   Seated flex stretch - 5x10\" TherEx:  Nustep, level 5 - 5 mins   Supine thoracic ext stretch with arms out to si

## 2020-11-24 ENCOUNTER — APPOINTMENT (OUTPATIENT)
Dept: PHYSICAL THERAPY | Facility: HOSPITAL | Age: 80
End: 2020-11-24
Attending: INTERNAL MEDICINE
Payer: MEDICARE

## 2020-12-01 ENCOUNTER — APPOINTMENT (OUTPATIENT)
Dept: PHYSICAL THERAPY | Facility: HOSPITAL | Age: 80
End: 2020-12-01
Attending: INTERNAL MEDICINE
Payer: MEDICARE

## 2020-12-03 ENCOUNTER — OFFICE VISIT (OUTPATIENT)
Dept: PHYSICAL THERAPY | Facility: HOSPITAL | Age: 80
End: 2020-12-03
Attending: INTERNAL MEDICINE
Payer: MEDICARE

## 2020-12-03 PROCEDURE — 97110 THERAPEUTIC EXERCISES: CPT

## 2020-12-03 NOTE — PROGRESS NOTES
Discharge Summary  Pt has attended 12 visits in Physical Therapy.      Dx: Acute right-sided low back pain with sciatica, sciatica laterality unspecified (M54.40)           Insurance (Authorized # of Visits):  Laisha Esqueda [phone] Physician: REAL during household chores to avoid increase in pain.  - met      Plan: D/c with HEP       Patient/Family/Caregiver was advised of these findings, precautions, and treatment options and has agreed to actively participate in planning and for this course of care Neuro Krystal:  Mid rows with green theraband - 15x3\"  Low rows with green theraband - 15x3\"   B shoulder ER with red theraband and focus on core stabilization - 15x3\"   Prone scap squeeze - 10x5\"  Prone w's - 10x3\" Neuro Krystal:  Mid rows with red thera

## 2021-01-01 RX ORDER — EZETIMIBE 10 MG/1
TABLET ORAL
Qty: 90 TABLET | Refills: 0 | Status: SHIPPED | OUTPATIENT
Start: 2021-01-01 | End: 2021-03-31

## 2021-02-01 RX ORDER — OLMESARTAN MEDOXOMIL 40 MG/1
TABLET ORAL
Qty: 90 TABLET | Refills: 0 | Status: SHIPPED | OUTPATIENT
Start: 2021-02-01 | End: 2021-04-29

## 2021-02-03 RX ORDER — SIMVASTATIN 40 MG
TABLET ORAL
Qty: 90 TABLET | Refills: 0 | Status: SHIPPED | OUTPATIENT
Start: 2021-02-03 | End: 2021-05-02

## 2021-02-07 RX ORDER — HYDROCHLOROTHIAZIDE 25 MG/1
TABLET ORAL
Qty: 90 TABLET | Refills: 0 | Status: SHIPPED | OUTPATIENT
Start: 2021-02-07 | End: 2021-05-07

## 2021-03-09 DIAGNOSIS — Z23 NEED FOR VACCINATION: ICD-10-CM

## 2021-03-31 RX ORDER — EZETIMIBE 10 MG/1
TABLET ORAL
Qty: 90 TABLET | Refills: 0 | Status: SHIPPED | OUTPATIENT
Start: 2021-03-31 | End: 2021-06-27

## 2021-04-29 RX ORDER — OLMESARTAN MEDOXOMIL 40 MG/1
TABLET ORAL
Qty: 90 TABLET | Refills: 0 | Status: SHIPPED | OUTPATIENT
Start: 2021-04-29 | End: 2021-07-26

## 2021-05-02 RX ORDER — SIMVASTATIN 40 MG
TABLET ORAL
Qty: 90 TABLET | Refills: 0 | Status: SHIPPED | OUTPATIENT
Start: 2021-05-02 | End: 2021-07-31

## 2021-05-07 RX ORDER — HYDROCHLOROTHIAZIDE 25 MG/1
TABLET ORAL
Qty: 90 TABLET | Refills: 0 | Status: SHIPPED | OUTPATIENT
Start: 2021-05-07 | End: 2021-08-02

## 2021-05-18 ENCOUNTER — OFFICE VISIT (OUTPATIENT)
Dept: INTERNAL MEDICINE CLINIC | Facility: CLINIC | Age: 81
End: 2021-05-18
Payer: MEDICARE

## 2021-05-18 VITALS
WEIGHT: 168 LBS | BODY MASS INDEX: 35 KG/M2 | OXYGEN SATURATION: 97 % | SYSTOLIC BLOOD PRESSURE: 135 MMHG | TEMPERATURE: 98 F | DIASTOLIC BLOOD PRESSURE: 72 MMHG | HEART RATE: 76 BPM

## 2021-05-18 DIAGNOSIS — I10 ESSENTIAL HYPERTENSION: Primary | ICD-10-CM

## 2021-05-18 PROCEDURE — 99213 OFFICE O/P EST LOW 20 MIN: CPT | Performed by: INTERNAL MEDICINE

## 2021-05-18 NOTE — PROGRESS NOTES
HPI/Subjective:     Patient ID: Omi Kingston is a 80year old female. HPI She came today for follow-up. According to the patient she is checking her blood pressure at home and is very well controlled. She is taking medications regularly.   BP Readings Medications   Medication Sig Dispense Refill   • HYDROCHLOROTHIAZIDE 25 MG Oral Tab TAKE ONE TABLET BY MOUTH DAILY 90 tablet 0   • SIMVASTATIN 40 MG Oral Tab TAKE ONE TABLET BY MOUTH ONCE NIGHTLY 90 tablet 0   • OLMESARTAN MEDOXOMIL 40 MG Oral Tab TAKE ONE Vascular: No JVD. Trachea: No tracheal tenderness or tracheal deviation. Cardiovascular:      Rate and Rhythm: Normal rate and regular rhythm. Heart sounds: Normal heart sounds. No murmur heard. No friction rub. No gallop.     Pulmonary:

## 2021-06-27 RX ORDER — EZETIMIBE 10 MG/1
TABLET ORAL
Qty: 90 TABLET | Refills: 0 | Status: SHIPPED | OUTPATIENT
Start: 2021-06-27 | End: 2021-09-22

## 2021-07-26 RX ORDER — OLMESARTAN MEDOXOMIL 40 MG/1
40 TABLET ORAL DAILY
Qty: 90 TABLET | Refills: 0 | Status: SHIPPED | OUTPATIENT
Start: 2021-07-26 | End: 2021-10-21

## 2021-07-31 RX ORDER — SIMVASTATIN 40 MG
40 TABLET ORAL NIGHTLY
Qty: 90 TABLET | Refills: 0 | Status: SHIPPED | OUTPATIENT
Start: 2021-07-31 | End: 2021-12-16

## 2021-08-02 RX ORDER — HYDROCHLOROTHIAZIDE 25 MG/1
25 TABLET ORAL DAILY
Qty: 90 TABLET | Refills: 1 | Status: SHIPPED | OUTPATIENT
Start: 2021-08-02 | End: 2022-01-29

## 2021-08-02 NOTE — TELEPHONE ENCOUNTER
Refill passed per [facility] protocol.      Requested Prescriptions   Pending Prescriptions Disp Refills    HYDROCHLOROTHIAZIDE 25 MG Oral Tab [Pharmacy Med Name: hydroCHLOROthiazide 25 MG TABLET] 90 tablet 0     Sig: TAKE ONE TABLET BY MOUTH DAILY        Hypertensive Medications Protocol Passed - 8/2/2021  5:04 AM        Passed - CMP or BMP in past 12 months        Passed - Appointment in past 6 or next 3 months        Passed - GFR Non- > 50     Lab Results   Component Value Date    GFRNAA 54 (L) 08/11/2020                      Recent Outpatient Visits              2 months ago Essential hypertension    [facility], Luke Adams MD    Office Visit    8 months ago     [place], [place]    Office Visit    8 months ago     [address] [place]    Office Visit    8 months ago     [address] [place]    Office Visit    8 months ago Low back pain with sciatica, sciatica laterality unspecified, unspecified back pain laterality, unspecified chronicity    [place] [employer] [place], Luke Adams MD    Office Visit             Future Appointments         Provider Department Appt Notes    In 1 week [place] Clemons MD [place] [facility], [address] [place] [address] px

## 2021-08-12 ENCOUNTER — TELEPHONE (OUTPATIENT)
Dept: INTERNAL MEDICINE CLINIC | Facility: CLINIC | Age: 81
End: 2021-08-12

## 2021-08-12 ENCOUNTER — OFFICE VISIT (OUTPATIENT)
Dept: INTERNAL MEDICINE CLINIC | Facility: CLINIC | Age: 81
End: 2021-08-12
Payer: MEDICARE

## 2021-08-12 VITALS
WEIGHT: 169 LBS | DIASTOLIC BLOOD PRESSURE: 75 MMHG | HEART RATE: 65 BPM | OXYGEN SATURATION: 97 % | SYSTOLIC BLOOD PRESSURE: 130 MMHG | BODY MASS INDEX: 35 KG/M2 | HEIGHT: 58.25 IN

## 2021-08-12 DIAGNOSIS — Z00.00 ANNUAL PHYSICAL EXAM: Primary | ICD-10-CM

## 2021-08-12 DIAGNOSIS — Z12.31 ENCOUNTER FOR SCREENING MAMMOGRAM FOR MALIGNANT NEOPLASM OF BREAST: ICD-10-CM

## 2021-08-12 DIAGNOSIS — Z00.00 ENCOUNTER FOR ANNUAL HEALTH EXAMINATION: ICD-10-CM

## 2021-08-12 LAB
ALBUMIN SERPL-MCNC: 4 G/DL (ref 3.4–5)
ALBUMIN/GLOB SERPL: 1.1 {RATIO} (ref 1–2)
ALP LIVER SERPL-CCNC: 72 U/L
ALT SERPL-CCNC: 20 U/L
ANION GAP SERPL CALC-SCNC: 5 MMOL/L (ref 0–18)
AST SERPL-CCNC: 16 U/L (ref 15–37)
BASOPHILS # BLD AUTO: 0.07 X10(3) UL (ref 0–0.2)
BASOPHILS NFR BLD AUTO: 1 %
BILIRUB SERPL-MCNC: 0.5 MG/DL (ref 0.1–2)
BUN BLD-MCNC: 30 MG/DL (ref 7–18)
BUN/CREAT SERPL: 30.3 (ref 10–20)
CALCIUM BLD-MCNC: 9.2 MG/DL (ref 8.5–10.1)
CHLORIDE SERPL-SCNC: 106 MMOL/L (ref 98–112)
CHOLEST SMN-MCNC: 199 MG/DL (ref ?–200)
CO2 SERPL-SCNC: 29 MMOL/L (ref 21–32)
CREAT BLD-MCNC: 0.99 MG/DL
DEPRECATED RDW RBC AUTO: 43.6 FL (ref 35.1–46.3)
EOSINOPHIL # BLD AUTO: 0.17 X10(3) UL (ref 0–0.7)
EOSINOPHIL NFR BLD AUTO: 2.5 %
ERYTHROCYTE [DISTWIDTH] IN BLOOD BY AUTOMATED COUNT: 13.2 % (ref 11–15)
EST. AVERAGE GLUCOSE BLD GHB EST-MCNC: 120 MG/DL (ref 68–126)
GLOBULIN PLAS-MCNC: 3.7 G/DL (ref 2.8–4.4)
GLUCOSE BLD-MCNC: 89 MG/DL (ref 70–99)
HBA1C MFR BLD HPLC: 5.8 % (ref ?–5.7)
HCT VFR BLD AUTO: 39.8 %
HDLC SERPL-MCNC: 74 MG/DL (ref 40–59)
HGB BLD-MCNC: 12.6 G/DL
IMM GRANULOCYTES # BLD AUTO: 0.02 X10(3) UL (ref 0–1)
IMM GRANULOCYTES NFR BLD: 0.3 %
LDLC SERPL CALC-MCNC: 98 MG/DL (ref ?–100)
LYMPHOCYTES # BLD AUTO: 1.55 X10(3) UL (ref 1–4)
LYMPHOCYTES NFR BLD AUTO: 22.7 %
M PROTEIN MFR SERPL ELPH: 7.7 G/DL (ref 6.4–8.2)
MCH RBC QN AUTO: 28.4 PG (ref 26–34)
MCHC RBC AUTO-ENTMCNC: 31.7 G/DL (ref 31–37)
MCV RBC AUTO: 89.6 FL
MONOCYTES # BLD AUTO: 0.45 X10(3) UL (ref 0.1–1)
MONOCYTES NFR BLD AUTO: 6.6 %
NEUTROPHILS # BLD AUTO: 4.56 X10 (3) UL (ref 1.5–7.7)
NEUTROPHILS # BLD AUTO: 4.56 X10(3) UL (ref 1.5–7.7)
NEUTROPHILS NFR BLD AUTO: 66.9 %
NONHDLC SERPL-MCNC: 125 MG/DL (ref ?–130)
OSMOLALITY SERPL CALC.SUM OF ELEC: 296 MOSM/KG (ref 275–295)
PATIENT FASTING Y/N/NP: YES
PATIENT FASTING Y/N/NP: YES
PLATELET # BLD AUTO: 198 10(3)UL (ref 150–450)
POTASSIUM SERPL-SCNC: 4.2 MMOL/L (ref 3.5–5.1)
RBC # BLD AUTO: 4.44 X10(6)UL
SODIUM SERPL-SCNC: 140 MMOL/L (ref 136–145)
TRIGL SERPL-MCNC: 160 MG/DL (ref 30–149)
TSI SER-ACNC: 2 MIU/ML (ref 0.36–3.74)
VLDLC SERPL CALC-MCNC: 26 MG/DL (ref 0–30)
WBC # BLD AUTO: 6.8 X10(3) UL (ref 4–11)

## 2021-08-12 PROCEDURE — 36415 COLL VENOUS BLD VENIPUNCTURE: CPT | Performed by: INTERNAL MEDICINE

## 2021-08-12 PROCEDURE — G0439 PPPS, SUBSEQ VISIT: HCPCS | Performed by: INTERNAL MEDICINE

## 2021-08-12 NOTE — PATIENT INSTRUCTIONS
Vandana Perez SCREENING SCHEDULE   Tests on this list are recommended by your physician but may not be covered, or covered at this frequency, by your insurer. Please check with your insurance carrier before scheduling to verify coverage.    PREVENTATIV recommendations at this time   Pap and Pelvic    Pap   Covered every 2 years for women at normal risk;  Annually if at high risk -  No recommendations at this time    Chlamydia Annually if high risk -  No recommendations at this time   Screening Mammogram [employer]. This site has a lot of good information including definitions of the different types of Advance Directives.  It also has the State forms available on it's website for anyone to review and print using their home computer and p

## 2021-08-12 NOTE — TELEPHONE ENCOUNTER
Pt calling stating that she usually has her MAMs done at [facility]. She is requesting if we can fax order over to them at  [phone]    Please advise.

## 2021-08-12 NOTE — PROGRESS NOTES
HPI:   Simone Joy is a 80year old female who presents for a Medicare Subsequent Annual Wellness visit (Pt already had Initial Annual Wellness).       Her last annual assessment has been over 1 year: Annual Physical due on 08/11/2021         Fall/Risk Ass Readings from Last 3 Encounters:  08/12/21 : 169 lb (76.7 kg)  05/18/21 : 168 lb (76.2 kg)  11/17/20 : 165 lb 3.2 oz (74.9 kg)     Last Cholesterol Labs:   Lab Results   Component Value Date    CHOLEST 191 08/11/2020    HDL 73 (H) 08/11/2020    LDL 92 08/1 exertion  CARDIOVASCULAR: denies chest pain on exertion  GI: denies abdominal pain, denies heartburn  : denies dysuria, vaginal discharge or itching, no complaint of urinary incontinence   MUSCULOSKELETAL: denies back pain  NEURO: denies headaches  PSYCH they think I may have hearing loss: No                Visual Acuity    Right Eye Chart Acuity: 20/25     Left Eye Chart Acuity: 20/20     Both Eyes Chart Acuity: 20/20          Physical Exam  Vitals reviewed.    Constitutional:       General: She is not in General: Normal range of motion. Cervical back: Normal range of motion and neck supple. Lymphadenopathy:      Cervical: No cervical adenopathy. Skin:     General: Skin is warm and dry. Findings: No erythema or rash. Nails:  There is no 8/12/2021     General Health     In the past six months, have you lost more than 10 pounds without trying?: 2 - No  Has your appetite been poor?: No  How does the patient maintain a good energy level?: Appropriate Exercise  How would you describe your emily abnormal -    No recommendations at this time    Flexible Sigmoidoscopy   Covered every 4 years -    Fecal Occult Blood Test Covered annually -   Bone Density Screening    Bone density screening    Covered every 2 years after age 72 if diagnosed with risk BUN Annually Lab Results   Component Value Date    BUN 23 (H) 08/11/2020       Drug Serum Conc Annually No results found for: DIGOXIN, DIG, VALP

## 2021-09-08 ENCOUNTER — TELEPHONE (OUTPATIENT)
Dept: INTERNAL MEDICINE CLINIC | Facility: CLINIC | Age: 81
End: 2021-09-08

## 2021-09-08 NOTE — TELEPHONE ENCOUNTER
Pt informed that Dr. Carlos Montero received pt's mammogram results--shows no evidence of malignancy, repeat test in one year.

## 2021-09-22 RX ORDER — EZETIMIBE 10 MG/1
10 TABLET ORAL NIGHTLY
Qty: 90 TABLET | Refills: 1 | Status: SHIPPED | OUTPATIENT
Start: 2021-09-22 | End: 2022-02-24

## 2021-09-22 NOTE — TELEPHONE ENCOUNTER
Refill passed per [place] [facility] protocol.       Requested Prescriptions   Pending Prescriptions Disp Refills    EZETIMIBE 10 MG Oral Tab [Pharmacy Med Name: EZETIMIBE 10 MG TABLET] 90 tablet 0     Sig: TAKE ONE TABLET BY MOUTH ONCE NIGHTLY        Cholesterol Medication Protocol Passed - 9/22/2021  5:04 AM        Passed - ALT in past 12 months        Passed - LDL in past 12 months        Passed - Last ALT < 80       Lab Results   Component Value Date    ALT 20 08/12/2021             Passed - Last LDL < 130     Lab Results   Component Value Date    LDL 98 08/12/2021               Passed - Appointment in past 12 or next 3 months               Future Appointments         Provider Department Appt Notes    In 4 months [facility]iss[facility] Hunt [facility], [address] [place] [address] 6 month fu             Recent Outpatient Visits              1 month ago Annual physical exam    Gisela Valdez MD    Office Visit    4 months ago Essential hypertension    [facility], [address] [place], [place] [address]dra Patricia MD    Office Visit    9 months ago     [place], [place]    Office Visit    10 months ago     [address] [place] [address] Cec[place]    Office Visit    10 months ago     [place], [place]    Office Visit

## 2021-10-21 RX ORDER — OLMESARTAN MEDOXOMIL 40 MG/1
40 TABLET ORAL DAILY
Qty: 90 TABLET | Refills: 1 | Status: SHIPPED | OUTPATIENT
Start: 2021-10-21 | End: 2022-02-24

## 2021-10-21 NOTE — TELEPHONE ENCOUNTER
Refill passed per [address] [place] protocol.   Requested Prescriptions   Pending Prescriptions Disp Refills    OLMESARTAN MEDOXOMIL 40 MG Oral Tab [Pharmacy Med Name: OLMESARTAN MEDOXOMIL 40 MG TAB] 90 tablet 0     Sig: TAKE ONE TABLET BY MOUTH DAILY        Hypertensive Medications Protocol Passed - 10/21/2021  5:04 AM        Passed - CMP or BMP in past 12 months        Passed - Appointment in past 6 or next 3 months        Passed - GFR Non- > 50     Lab Results   Component Value Date    GFRNAA 54 (L) 08/12/2021                        Recent Outpatient Visits              2 months ago Annual physical exam    [address], [place], [place]    Office Visit    5 months ago Essential hypertension    [address], [place], [place]    Office Visit    10 months ago     [address] [place], [place]    Office Visit    11 months ago     [address] [place], [place]    Office Visit    11 months ago     [address] [place], [place]    Office Visit            Future Appointments         Provider Department Appt Notes    In 3 months Sandra Fleming MD [address] 6 month fu

## 2021-12-16 RX ORDER — SIMVASTATIN 40 MG
40 TABLET ORAL NIGHTLY
Qty: 90 TABLET | Refills: 0 | Status: SHIPPED | OUTPATIENT
Start: 2021-12-16

## 2022-01-29 RX ORDER — HYDROCHLOROTHIAZIDE 25 MG/1
25 TABLET ORAL DAILY
Qty: 90 TABLET | Refills: 1 | Status: SHIPPED | OUTPATIENT
Start: 2022-01-29

## 2022-01-29 NOTE — TELEPHONE ENCOUNTER
Refill passed per [facility], [facility] protocol.   Requested Prescriptions   Pending Prescriptions Disp Refills    HYDROCHLOROTHIAZIDE 25 MG Oral Tab [Pharmacy Med Name: hydroCHLOROthiazide 25 MG TABLET] 90 tablet 1     Sig: TAKE ONE TABLET BY MOUTH DAILY

## 2022-02-14 ENCOUNTER — TELEPHONE (OUTPATIENT)
Dept: INTERNAL MEDICINE CLINIC | Facility: CLINIC | Age: 82
End: 2022-02-14

## 2022-02-14 NOTE — TELEPHONE ENCOUNTER
She can get shingles vaccine at pharmacy or at our office, she needs to be aware that insurance does not pay completely she needs to check about the price

## 2022-02-14 NOTE — TELEPHONE ENCOUNTER
Patient stated her  currently has shingles and is taking care of him. She would like information about getting a shingles vaccine or to see if she is eligible. Please advise.

## 2022-02-14 NOTE — TELEPHONE ENCOUNTER
Patient wants the ok from Dr Preeti Green if is ok for her to get the Shingles vaccine even though her  has it. She states she has been taking of her  but she is protecting herself. She said se already checked with pharmacy and she has to pays 120.

## 2022-02-24 ENCOUNTER — OFFICE VISIT (OUTPATIENT)
Dept: INTERNAL MEDICINE CLINIC | Facility: CLINIC | Age: 82
End: 2022-02-24
Payer: MEDICARE

## 2022-02-24 VITALS
HEART RATE: 71 BPM | BODY MASS INDEX: 34.17 KG/M2 | SYSTOLIC BLOOD PRESSURE: 135 MMHG | TEMPERATURE: 97 F | OXYGEN SATURATION: 97 % | DIASTOLIC BLOOD PRESSURE: 80 MMHG | HEIGHT: 58.25 IN | WEIGHT: 165 LBS

## 2022-02-24 DIAGNOSIS — I10 PRIMARY HYPERTENSION: Primary | ICD-10-CM

## 2022-02-24 PROCEDURE — 99213 OFFICE O/P EST LOW 20 MIN: CPT | Performed by: INTERNAL MEDICINE

## 2022-02-24 RX ORDER — EZETIMIBE 10 MG/1
10 TABLET ORAL NIGHTLY
Qty: 90 TABLET | Refills: 1 | Status: CANCELLED | OUTPATIENT
Start: 2022-02-24

## 2022-02-24 RX ORDER — HYDROCHLOROTHIAZIDE 25 MG/1
25 TABLET ORAL DAILY
Qty: 90 TABLET | Refills: 1 | Status: SHIPPED | OUTPATIENT
Start: 2022-02-24

## 2022-02-24 RX ORDER — OLMESARTAN MEDOXOMIL 40 MG/1
40 TABLET ORAL DAILY
Qty: 90 TABLET | Refills: 1 | Status: SHIPPED | OUTPATIENT
Start: 2022-02-24

## 2022-02-24 RX ORDER — SIMVASTATIN 40 MG
40 TABLET ORAL NIGHTLY
Qty: 90 TABLET | Refills: 0 | Status: SHIPPED | OUTPATIENT
Start: 2022-02-24

## 2022-02-24 NOTE — PATIENT INSTRUCTIONS
htn- well controlled , continue with current medication,watch diet , avoid salty food , continue with current medication     Hypercholesterolemia - watch diet , avoid fried food ,red meat, more fruits and vegetables , continue with simvastatin

## 2022-03-07 ENCOUNTER — NURSE TRIAGE (OUTPATIENT)
Dept: INTERNAL MEDICINE CLINIC | Facility: CLINIC | Age: 82
End: 2022-03-07

## 2022-03-08 ENCOUNTER — TELEPHONE (OUTPATIENT)
Dept: INTERNAL MEDICINE CLINIC | Facility: CLINIC | Age: 82
End: 2022-03-08

## 2022-03-08 NOTE — TELEPHONE ENCOUNTER
Pt scheduled an appt to see Dr. El Barrera for Friday 3-11-22 for pre op clearance. Pt states that she will be having eye surgery on 3-25-22 with Dr. José Miguel Dobson at [facility].

## 2022-03-08 NOTE — PATIENT INSTRUCTIONS
Anxiety- discussed how to cope with daily stress and anxiety , will start him on medicatio    htn- monitor bp , continue with current medciation

## 2022-03-11 ENCOUNTER — OFFICE VISIT (OUTPATIENT)
Dept: INTERNAL MEDICINE CLINIC | Facility: CLINIC | Age: 82
End: 2022-03-11
Payer: MEDICARE

## 2022-03-11 VITALS
OXYGEN SATURATION: 99 % | DIASTOLIC BLOOD PRESSURE: 80 MMHG | WEIGHT: 167 LBS | SYSTOLIC BLOOD PRESSURE: 160 MMHG | TEMPERATURE: 98 F | HEIGHT: 58.25 IN | BODY MASS INDEX: 34.58 KG/M2 | HEART RATE: 78 BPM

## 2022-03-11 DIAGNOSIS — I10 PRIMARY HYPERTENSION: Primary | ICD-10-CM

## 2022-03-11 DIAGNOSIS — Z01.810 PREOP CARDIOVASCULAR EXAM: ICD-10-CM

## 2022-03-11 PROCEDURE — 93000 ELECTROCARDIOGRAM COMPLETE: CPT | Performed by: INTERNAL MEDICINE

## 2022-03-11 PROCEDURE — 99213 OFFICE O/P EST LOW 20 MIN: CPT | Performed by: INTERNAL MEDICINE

## 2022-03-11 RX ORDER — AMLODIPINE BESYLATE 10 MG/1
10 TABLET ORAL DAILY
Qty: 90 TABLET | Refills: 0 | Status: SHIPPED | OUTPATIENT
Start: 2022-03-11

## 2022-03-14 ENCOUNTER — TELEPHONE (OUTPATIENT)
Dept: INTERNAL MEDICINE CLINIC | Facility: CLINIC | Age: 82
End: 2022-03-14

## 2022-03-14 NOTE — TELEPHONE ENCOUNTER
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 Tasked to Dr Rhett Carlos - should pt continue with these medications?   Please reply to pool: SHAR Jung Aw

## 2022-03-15 NOTE — TELEPHONE ENCOUNTER
Spoke to Fabrice Block from [facility] [employer]. Relayed message from Dr. Gwen Chapa below. Fabrice Block verbalized understanding.

## 2022-03-17 DIAGNOSIS — Z11.59 ENCOUNTER FOR SCREENING FOR OTHER VIRAL DISEASES: Primary | ICD-10-CM

## 2022-03-19 ENCOUNTER — LAB ENCOUNTER (OUTPATIENT)
Dept: LAB | Facility: HOSPITAL | Age: 82
End: 2022-03-19
Attending: INTERNAL MEDICINE
Payer: MEDICARE

## 2022-03-19 DIAGNOSIS — Z01.810 PREOP CARDIOVASCULAR EXAM: ICD-10-CM

## 2022-03-19 LAB
ALBUMIN SERPL-MCNC: 4 G/DL (ref 3.4–5)
ALP LIVER SERPL-CCNC: 82 U/L
ALT SERPL-CCNC: 18 U/L
ANION GAP SERPL CALC-SCNC: 9 MMOL/L (ref 0–18)
AST SERPL-CCNC: 11 U/L (ref 15–37)
BILIRUB SERPL-MCNC: 0.4 MG/DL (ref 0.1–2)
BUN BLD-MCNC: 41 MG/DL (ref 7–18)
BUN/CREAT SERPL: 32.5 (ref 10–20)
CALCIUM BLD-MCNC: 9.4 MG/DL (ref 8.5–10.1)
CHLORIDE SERPL-SCNC: 106 MMOL/L (ref 98–112)
CO2 SERPL-SCNC: 28 MMOL/L (ref 21–32)
CREAT BLD-MCNC: 1.26 MG/DL
FASTING STATUS PATIENT QL REPORTED: NO
GLOBULIN PLAS-MCNC: 3.1 G/DL (ref 2.8–4.4)
GLUCOSE BLD-MCNC: 97 MG/DL (ref 70–99)
OSMOLALITY SERPL CALC.SUM OF ELEC: 306 MOSM/KG (ref 275–295)
POTASSIUM SERPL-SCNC: 4.4 MMOL/L (ref 3.5–5.1)
PROT SERPL-MCNC: 7.1 G/DL (ref 6.4–8.2)
SODIUM SERPL-SCNC: 143 MMOL/L (ref 136–145)

## 2022-03-19 PROCEDURE — 80053 COMPREHEN METABOLIC PANEL: CPT

## 2022-03-19 PROCEDURE — 36415 COLL VENOUS BLD VENIPUNCTURE: CPT

## 2022-03-19 RX ORDER — EZETIMIBE 10 MG/1
TABLET ORAL
Qty: 90 TABLET | Refills: 0 | OUTPATIENT
Start: 2022-03-19

## 2022-03-22 ENCOUNTER — TELEPHONE (OUTPATIENT)
Dept: INTERNAL MEDICINE CLINIC | Facility: CLINIC | Age: 82
End: 2022-03-22

## 2022-03-22 NOTE — TELEPHONE ENCOUNTER
Please advise, [facility] calling again to request results. If not received by today they will cancel patients surgery.

## 2022-03-22 NOTE — TELEPHONE ENCOUNTER
Received call from Noman Victor RN with [facility]. Patient's date of birth and full name both confirmed. Phone: [phone]  Fax [phone]  Requesting CMP Results. Pre-op. Entropion repair of Left eye Dr. Richardson Dry on 3/25/22. RN called [phone] [facility]. Spoke with [employer] - who confirmed that the surgery will take place at [facility]. She confirmed this fax number:  [phone]    RN faxed CMP results to Noman Victor, [facility], via [employer].

## 2022-03-23 ENCOUNTER — LAB SERVICES (OUTPATIENT)
Dept: LAB | Age: 82
End: 2022-03-23

## 2022-03-23 PROCEDURE — U0005 INFEC AGEN DETEC AMPLI PROBE: HCPCS | Performed by: CLINICAL MEDICAL LABORATORY

## 2022-03-23 PROCEDURE — U0003 INFECTIOUS AGENT DETECTION BY NUCLEIC ACID (DNA OR RNA); SEVERE ACUTE RESPIRATORY SYNDROME CORONAVIRUS 2 (SARS-COV-2) (CORONAVIRUS DISEASE [COVID-19]), AMPLIFIED PROBE TECHNIQUE, MAKING USE OF HIGH THROUGHPUT TECHNOLOGIES AS DESCRIBED BY CMS-2020-01-R: HCPCS | Performed by: CLINICAL MEDICAL LABORATORY

## 2022-03-24 LAB
SARS-COV-2 RNA RESP QL NAA+PROBE: NOT DETECTED
SERVICE CMNT-IMP: NORMAL
SERVICE CMNT-IMP: NORMAL

## 2022-04-05 ENCOUNTER — OFFICE VISIT (OUTPATIENT)
Dept: INTERNAL MEDICINE CLINIC | Facility: CLINIC | Age: 82
End: 2022-04-05
Payer: MEDICARE

## 2022-04-05 VITALS
BODY MASS INDEX: 34.72 KG/M2 | WEIGHT: 165.38 LBS | HEART RATE: 71 BPM | SYSTOLIC BLOOD PRESSURE: 130 MMHG | DIASTOLIC BLOOD PRESSURE: 75 MMHG | HEIGHT: 58 IN | OXYGEN SATURATION: 99 %

## 2022-04-05 DIAGNOSIS — I10 PRIMARY HYPERTENSION: Primary | ICD-10-CM

## 2022-04-05 PROCEDURE — 99213 OFFICE O/P EST LOW 20 MIN: CPT | Performed by: INTERNAL MEDICINE

## 2022-06-01 RX ORDER — CITALOPRAM 10 MG/1
10 TABLET ORAL DAILY
Qty: 90 TABLET | Refills: 1 | Status: SHIPPED | OUTPATIENT
Start: 2022-06-01

## 2022-06-01 NOTE — TELEPHONE ENCOUNTER
Please review refill protocol failed/ no protocol  Requested Prescriptions   Pending Prescriptions Disp Refills    CITALOPRAM 10 MG Oral Tab [Pharmacy Med Name: CITALOPRAM HBR 10 MG TABLET] 90 tablet 0     Sig: TAKE ONE TABLET BY MOUTH DAILY        There is no refill protocol information for this order

## 2022-06-09 RX ORDER — AMLODIPINE BESYLATE 10 MG/1
TABLET ORAL
Qty: 90 TABLET | Refills: 0 | Status: SHIPPED | OUTPATIENT
Start: 2022-06-09

## 2022-06-09 RX ORDER — SIMVASTATIN 40 MG
TABLET ORAL
Qty: 90 TABLET | Refills: 0 | Status: SHIPPED | OUTPATIENT
Start: 2022-06-09

## 2022-08-26 ENCOUNTER — OFFICE VISIT (OUTPATIENT)
Dept: INTERNAL MEDICINE CLINIC | Facility: CLINIC | Age: 82
End: 2022-08-26
Payer: MEDICARE

## 2022-08-26 ENCOUNTER — LAB ENCOUNTER (OUTPATIENT)
Dept: LAB | Age: 82
End: 2022-08-26
Attending: INTERNAL MEDICINE
Payer: MEDICARE

## 2022-08-26 VITALS
OXYGEN SATURATION: 99 % | HEART RATE: 66 BPM | TEMPERATURE: 97 F | WEIGHT: 167 LBS | BODY MASS INDEX: 35.05 KG/M2 | SYSTOLIC BLOOD PRESSURE: 135 MMHG | DIASTOLIC BLOOD PRESSURE: 70 MMHG | HEIGHT: 58 IN

## 2022-08-26 DIAGNOSIS — Z00.00 ANNUAL PHYSICAL EXAM: ICD-10-CM

## 2022-08-26 DIAGNOSIS — Z00.00 ENCOUNTER FOR ANNUAL HEALTH EXAMINATION: ICD-10-CM

## 2022-08-26 DIAGNOSIS — F32.0 CURRENT MILD EPISODE OF MAJOR DEPRESSIVE DISORDER, UNSPECIFIED WHETHER RECURRENT (HCC): ICD-10-CM

## 2022-08-26 DIAGNOSIS — Z00.00 ANNUAL PHYSICAL EXAM: Primary | ICD-10-CM

## 2022-08-26 DIAGNOSIS — Z12.31 ENCOUNTER FOR SCREENING MAMMOGRAM FOR MALIGNANT NEOPLASM OF BREAST: ICD-10-CM

## 2022-08-26 LAB
ALBUMIN SERPL-MCNC: 3.8 G/DL (ref 3.4–5)
ALBUMIN/GLOB SERPL: 1.1 {RATIO} (ref 1–2)
ALP LIVER SERPL-CCNC: 72 U/L
ALT SERPL-CCNC: 17 U/L
ANION GAP SERPL CALC-SCNC: 4 MMOL/L (ref 0–18)
AST SERPL-CCNC: 11 U/L (ref 15–37)
BASOPHILS # BLD AUTO: 0.05 X10(3) UL (ref 0–0.2)
BASOPHILS NFR BLD AUTO: 0.8 %
BILIRUB SERPL-MCNC: 0.4 MG/DL (ref 0.1–2)
BUN BLD-MCNC: 33 MG/DL (ref 7–18)
BUN/CREAT SERPL: 33.3 (ref 10–20)
CALCIUM BLD-MCNC: 9.3 MG/DL (ref 8.5–10.1)
CHLORIDE SERPL-SCNC: 105 MMOL/L (ref 98–112)
CHOLEST SERPL-MCNC: 203 MG/DL (ref ?–200)
CO2 SERPL-SCNC: 29 MMOL/L (ref 21–32)
CREAT BLD-MCNC: 0.99 MG/DL
DEPRECATED RDW RBC AUTO: 43.6 FL (ref 35.1–46.3)
EOSINOPHIL # BLD AUTO: 0.13 X10(3) UL (ref 0–0.7)
EOSINOPHIL NFR BLD AUTO: 2.2 %
ERYTHROCYTE [DISTWIDTH] IN BLOOD BY AUTOMATED COUNT: 13.2 % (ref 11–15)
EST. AVERAGE GLUCOSE BLD GHB EST-MCNC: 123 MG/DL (ref 68–126)
FASTING PATIENT LIPID ANSWER: YES
FASTING STATUS PATIENT QL REPORTED: YES
GFR SERPLBLD BASED ON 1.73 SQ M-ARVRAT: 58 ML/MIN/1.73M2 (ref 60–?)
GLOBULIN PLAS-MCNC: 3.5 G/DL (ref 2.8–4.4)
GLUCOSE BLD-MCNC: 90 MG/DL (ref 70–99)
HBA1C MFR BLD: 5.9 % (ref ?–5.7)
HCT VFR BLD AUTO: 39.4 %
HDLC SERPL-MCNC: 68 MG/DL (ref 40–59)
HGB BLD-MCNC: 12.5 G/DL
IMM GRANULOCYTES # BLD AUTO: 0.02 X10(3) UL (ref 0–1)
IMM GRANULOCYTES NFR BLD: 0.3 %
LDLC SERPL CALC-MCNC: 111 MG/DL (ref ?–100)
LYMPHOCYTES # BLD AUTO: 1.27 X10(3) UL (ref 1–4)
LYMPHOCYTES NFR BLD AUTO: 21.1 %
MCH RBC QN AUTO: 28.2 PG (ref 26–34)
MCHC RBC AUTO-ENTMCNC: 31.7 G/DL (ref 31–37)
MCV RBC AUTO: 88.9 FL
MONOCYTES # BLD AUTO: 0.37 X10(3) UL (ref 0.1–1)
MONOCYTES NFR BLD AUTO: 6.2 %
NEUTROPHILS # BLD AUTO: 4.17 X10 (3) UL (ref 1.5–7.7)
NEUTROPHILS # BLD AUTO: 4.17 X10(3) UL (ref 1.5–7.7)
NEUTROPHILS NFR BLD AUTO: 69.4 %
NONHDLC SERPL-MCNC: 135 MG/DL (ref ?–130)
OSMOLALITY SERPL CALC.SUM OF ELEC: 293 MOSM/KG (ref 275–295)
PLATELET # BLD AUTO: 169 10(3)UL (ref 150–450)
POTASSIUM SERPL-SCNC: 3.8 MMOL/L (ref 3.5–5.1)
PROT SERPL-MCNC: 7.3 G/DL (ref 6.4–8.2)
RBC # BLD AUTO: 4.43 X10(6)UL
SODIUM SERPL-SCNC: 138 MMOL/L (ref 136–145)
TRIGL SERPL-MCNC: 140 MG/DL (ref 30–149)
TSI SER-ACNC: 1.76 MIU/ML (ref 0.36–3.74)
VLDLC SERPL CALC-MCNC: 24 MG/DL (ref 0–30)
WBC # BLD AUTO: 6 X10(3) UL (ref 4–11)

## 2022-08-26 PROCEDURE — 84443 ASSAY THYROID STIM HORMONE: CPT

## 2022-08-26 PROCEDURE — 80061 LIPID PANEL: CPT

## 2022-08-26 PROCEDURE — 36415 COLL VENOUS BLD VENIPUNCTURE: CPT

## 2022-08-26 PROCEDURE — 80053 COMPREHEN METABOLIC PANEL: CPT

## 2022-08-26 PROCEDURE — 85025 COMPLETE CBC W/AUTO DIFF WBC: CPT

## 2022-08-26 PROCEDURE — 83036 HEMOGLOBIN GLYCOSYLATED A1C: CPT

## 2022-08-29 ENCOUNTER — TELEPHONE (OUTPATIENT)
Dept: INTERNAL MEDICINE CLINIC | Facility: CLINIC | Age: 82
End: 2022-08-29

## 2022-08-29 NOTE — TELEPHONE ENCOUNTER
Patient called and stated that Margarito needed her mammogram order faxed. Did not have fax number. I was able to call and obtain the fax. Ordered faxed as requested to [phone].

## 2022-09-06 RX ORDER — SIMVASTATIN 40 MG
40 TABLET ORAL NIGHTLY
Qty: 90 TABLET | Refills: 1 | Status: SHIPPED | OUTPATIENT
Start: 2022-09-06 | End: 2023-03-03

## 2022-09-06 NOTE — TELEPHONE ENCOUNTER
Refill passed per [address] [place] protocol.   Requested Prescriptions   Pending Prescriptions Disp Refills    SIMVASTATIN 40 MG Oral Tab [Pharmacy Med Name: SIMVASTATIN 40 MG TABLET] 90 tablet 0     Sig: TAKE ONE TABLET BY MOUTH ONCE NIGHTLY        Cholesterol Medication Protocol Passed - 9/6/2022  5:04 AM        Passed - ALT in past 12 months        Passed - LDL in past 12 months        Passed - Last ALT < 80       Lab Results   Component Value Date    ALT 17 08/26/2022             Passed - Last LDL < 130     Lab Results   Component Value Date     (H) 08/26/2022               Passed - In person appointment or virtual visit in the past 12 mos or appointment in next 3 mos       Recent Outpatient Visits              1 week ago Annual physical exam    [address]    Office Visit    5 months ago Primary hypertension    [address] MD    Office Visit    5 months ago Primary hypertension    [address] MD    Office Visit    6 months ago Jaron Murrell MD    Office Visit    6 months ago Primary hypertension    [address] NimitzCynthia MD    Office Visit     Future Appointments         Provider Department Appt Notes    In 5 months Judge Rhoda MD [address]   6 month fu                    Recent Outpatient Visits              1 week ago Annual physical exam    [address] MD    Office Visit    5 months ago Primary hypertension    [address] MD    Office Visit    5 months ago Primary hypertension    [address], [place], [place], [place]    Office Visit    6 months ago Jaron Murrell MD Office Visit    6 months ago Primary hypertension    [address] MooersLuis MD    Office Visit           Future Appointments         Provider Department Appt Notes    In 5 months Sandra Fleming MD [address], [place], [place] px  6 month fu

## 2022-09-12 ENCOUNTER — TELEPHONE (OUTPATIENT)
Dept: INTERNAL MEDICINE CLINIC | Facility: CLINIC | Age: 82
End: 2022-09-12

## 2022-09-12 NOTE — TELEPHONE ENCOUNTER
Patient called stating mammogram orders were not received by [facility]on. Would like them resent at [phone].

## 2022-09-21 ENCOUNTER — TELEPHONE (OUTPATIENT)
Dept: INTERNAL MEDICINE CLINIC | Facility: CLINIC | Age: 82
End: 2022-09-21

## 2022-09-21 NOTE — TELEPHONE ENCOUNTER
Patient asking if she can have mammogram done at [facility], states its harder for her to get to [address] [place] [address] advise.

## 2022-09-21 NOTE — TELEPHONE ENCOUNTER
Pt called back, relayed Dr Ciera [place] called [facility] she had Mammogram - was advised need an order     Please Fax  order to [phone]

## 2022-09-21 NOTE — TELEPHONE ENCOUNTER
Pt informed that order for mammogram has been written and pt can schedule appt thru mychart or out patient scheduling, pt given info.

## 2022-10-10 RX ORDER — OLMESARTAN MEDOXOMIL 40 MG/1
40 TABLET ORAL DAILY
Qty: 90 TABLET | Refills: 1 | Status: SHIPPED | OUTPATIENT
Start: 2022-10-10 | End: 2023-04-07

## 2022-10-10 NOTE — TELEPHONE ENCOUNTER
Refill passed per [employer] [facility] protocol.     Requested Prescriptions   Pending Prescriptions Disp Refills    OLMESARTAN MEDOXOMIL 40 MG Oral Tab [Pharmacy Med Name: OLMESARTAN MEDOXOMIL 40 MG TAB] 90 tablet 1     Sig: TAKE ONE TABLET BY MOUTH DAILY        Hypertensive Medications Protocol Passed - 10/9/2022  5:34 AM        Passed - In person appointment in the past 12 or next 3 months       Recent Outpatient Visits              1 month ago Annual physical exam    Lory Wright MD    Office Visit    6 months ago Primary hypertension    [facility], [facility], Yesika Braswell MD    Office Visit    7 months ago [facility] hypertension    [facility], Yesika Braswell MD    Office Visit    7 months ago [facility], Yesika Braswell MD    Office Visit    7 months ago Primary hypertension    [facility], [facility], Chang Diaz [place], [place]    Office Visit     Future Appointments         Provider Department Appt Notes    In 4 days [facility] OF THE [address] I want to know the results of my mammogram.    In 4 months Kelsea Muir MD [place] [employer] [facility] [place], [place]x  6 month fu               Passed - Last BP reading less than 140/90     BP Readings from Last 1 Encounters:  08/26/22 : 135/70                Passed - CMP or BMP in past 6 months     Recent Results (from the past 4392 hour(s))   COMP METABOLIC PANEL (14)    Collection Time: 08/26/22 10:54 AM   Result Value Ref Range    Glucose 90 70 - 99 mg/dL    Sodium 138 136 - 145 mmol/L    Potassium 3.8 3.5 - 5.1 mmol/L    Chloride 105 98 - 112 mmol/L    CO2 29.0 21.0 - 32.0 mmol/L    Anion Gap 4 0 - 18 mmol/L    BUN 33 (H) 7 - 18 mg/dL    Creatinine 0.99 0.55 - 1.02 mg/dL    BUN/CREA Ratio 33.3 (H) 10.0 - 20.0    Calcium, Total 9.3 8.5 - 10.1 mg/dL    Calculated Osmolality 293 275 - 295 mOsm/kg    eGFR-Cr 58 (L) >=60 mL/min/1.73m2    ALT 17 13 - 56 U/L    AST 11 (L) 15 - 37 U/L    Alkaline Phosphatase 72 55 - 142 U/L    Bilirubin, Total 0.4 0.1 - 2.0 mg/dL    Total Protein 7.3 6.4 - 8.2 g/dL    Albumin 3.8 3.4 - 5.0 g/dL    Globulin  3.5 2.8 - 4.4 g/dL    A/G Ratio 1.1 1.0 - 2.0    Patient Fasting for CMP? Yes      *Note: Due to a large number of results and/or encounters for the requested time period, some results have not been displayed. A complete set of results can be found in Results Review.                  Passed - In person appointment or virtual visit in the past 6 months       Recent Outpatient Visits              1 month ago Annual physical exam    Gary Allen MD    Office Visit    6 months ago Primary hypertension    [facility], [facility], Sander Márquez MD    Office Visit    7 months ago Primary hypertension    [place] [employer] [email or web address], Sander Márquez MD    Office Visit    7 months ago [facility], [facility], Waqar Sweet MD    Office Visit    7 months ago Primary hypertension    [place] [employer] [place], [facility], [address] [place], [place] [address]santo Abreu MD    Office Visit     Future Appointments         Provider Department Appt Notes    In 4 days [facility] OF THE [address] I want to know the results of my mammogram.    In 4 months Darius Bolton MD [place] [facility], [facility], [place], [place] px  6 month fu               Passed - [facility] or GFRNAA > 50     GFR Evaluation  EGFRCR: 58 , resulted on 8/26/2022                  Recent Outpatient Visits              1 month ago Annual physical exam    [place] [employer] [email or web address], Sander Márquez MD    Office Visit    6 months ago Primary hypertension    [place] [employer] [place], [facility]    Office Visit    7 months ago Primary hypertension    [place] [facility], [facility] Saul Carnes MD    Office Visit    7 months ago [facility], Thom Mcfadden MD    Office Visit    7 months ago Primary hypertension    [address], [address] [place] [address], Luis Diaz MD    Office Visit            Future Appointments         Provider Department Appt Notes    In 4 days [facility] OF THE [address] I want to know the results of my mammogram.    In 4 months Sandra Fleming MD [address], [place] px  6 month fu

## 2022-10-14 ENCOUNTER — HOSPITAL ENCOUNTER (OUTPATIENT)
Dept: MAMMOGRAPHY | Facility: HOSPITAL | Age: 82
Discharge: HOME | End: 2022-10-14
Attending: INTERNAL MEDICINE
Payer: MEDICARE

## 2022-10-14 ENCOUNTER — HOSPITAL ENCOUNTER (OUTPATIENT)
Dept: ULTRASOUND IMAGING | Facility: HOSPITAL | Age: 82
Discharge: HOME | End: 2022-10-14
Attending: INTERNAL MEDICINE
Payer: MEDICARE

## 2022-10-14 DIAGNOSIS — R92.8 ABNORMAL MAMMOGRAM OF LEFT BREAST: ICD-10-CM

## 2022-10-14 DIAGNOSIS — Z12.31 ENCOUNTER FOR SCREENING MAMMOGRAM FOR MALIGNANT NEOPLASM OF BREAST: ICD-10-CM

## 2022-10-14 PROCEDURE — 77061 BREAST TOMOSYNTHESIS UNI: CPT | Performed by: INTERNAL MEDICINE

## 2022-10-14 PROCEDURE — 76642 ULTRASOUND BREAST LIMITED: CPT | Performed by: INTERNAL MEDICINE

## 2022-10-14 PROCEDURE — 77065 DX MAMMO INCL CAD UNI: CPT | Performed by: INTERNAL MEDICINE

## 2022-11-25 RX ORDER — CITALOPRAM 10 MG/1
TABLET ORAL
Qty: 90 TABLET | Refills: 1 | Status: SHIPPED | OUTPATIENT
Start: 2022-11-25

## 2022-11-25 NOTE — TELEPHONE ENCOUNTER
Refill passed per [facility] protocol.     Requested Prescriptions   Pending Prescriptions Disp Refills    CITALOPRAM 10 MG Oral Tab [Pharmacy Med Name: CITALOPRAM HBR 10 MG TABLET] 90 tablet 1     Sig: TAKE ONE TABLET BY MOUTH DAILY       Psychiatric Non-Scheduled (Anti-Anxiety) Passed - 11/25/2022  5:04 AM        Passed - In person appointment or virtual visit in the past 6 mos or appointment in next 3 mos     Recent Outpatient Visits              3 months ago Annual physical exam    [facility], Amanda Rai MD    Office Visit    7 months ago Primary hypertension    [facility], Amanda Rai MD    Office Visit    8 months ago Primary hypertension    [facility], Aisha Salmeron MD    Office Visit    8 months ago Amanda Balbuena MD    Office Visit    9 months ago Primary hypertension    [facility], ChinaJesus MD    Office Visit          Future Appointments         Provider Department Appt Notes    In 3 months Carola Schuster MD [facility], [facility]  6 month fu                     Recent Outpatient Visits              3 months ago Annual physical exam    [facility], Amanda Rai MD    Office Visit    7 months ago Primary hypertension    [facility], Amanda Rai MD    Office Visit    8 months ago [facility] hypertension    [facility]    Office Visit    8 months ago Amanda Balbuena MD    Office Visit    9 months ago Primary hypertension    [facility], [facility], [address] Carola Schuster MD    Office Visit            Future Appointments         Provider Department Appt Notes    In 3 months Carola Schuster MD [facility] Vidhya [place] px  6 month fu

## 2023-02-11 ENCOUNTER — NURSE TRIAGE (OUTPATIENT)
Dept: INTERNAL MEDICINE CLINIC | Facility: CLINIC | Age: 83
End: 2023-02-11

## 2023-02-11 LAB — AMB EXT COVID-19 RESULT: DETECTED

## 2023-03-31 ENCOUNTER — OFFICE VISIT (OUTPATIENT)
Dept: INTERNAL MEDICINE CLINIC | Facility: CLINIC | Age: 83
End: 2023-03-31

## 2023-03-31 VITALS
BODY MASS INDEX: 35.89 KG/M2 | HEART RATE: 77 BPM | TEMPERATURE: 98 F | SYSTOLIC BLOOD PRESSURE: 139 MMHG | WEIGHT: 171 LBS | OXYGEN SATURATION: 98 % | HEIGHT: 58 IN | DIASTOLIC BLOOD PRESSURE: 74 MMHG

## 2023-03-31 DIAGNOSIS — I10 PRIMARY HYPERTENSION: Primary | ICD-10-CM

## 2023-03-31 PROCEDURE — 99214 OFFICE O/P EST MOD 30 MIN: CPT | Performed by: INTERNAL MEDICINE

## 2023-03-31 PROCEDURE — 1126F AMNT PAIN NOTED NONE PRSNT: CPT | Performed by: INTERNAL MEDICINE

## 2023-04-07 RX ORDER — OLMESARTAN MEDOXOMIL 40 MG/1
40 TABLET ORAL DAILY
Qty: 90 TABLET | Refills: 3 | Status: SHIPPED | OUTPATIENT
Start: 2023-04-07

## 2023-04-07 NOTE — TELEPHONE ENCOUNTER
Please review refill failed/no protocol     Requested Prescriptions     Pending Prescriptions Disp Refills    OLMESARTAN MEDOXOMIL 40 MG Oral Tab [Pharmacy Med Name: OLMESARTAN MEDOXOMIL 40 MG TAB] 90 tablet 1     Sig: TAKE ONE TABLET BY MOUTH DAILY         Recent Visits  Date Type Provider Dept   03/31/23 Office Visit Carola Schuster MD [facility]   08/26/22 Office Visit Carola Schuster MD [facility]   04/05/22 Office Visit Carola Schuster MD [facility]   03/11/22 Office Visit Carola Schuster MD [facility]   03/08/22 Office Visit Carola Schuster MD [facility]   02/24/22 Office Visit Carola Schuster MD [facility]   Showing recent visits within past 540 days with a meds authorizing provider and meeting all other requirements  Future Appointments  Date Type Provider Dept   08/11/23 Appointment Carola Schuster MD [facility]   Showing future appointments within next 150 days with a meds authorizing provider and meeting all other requirements    Requested Prescriptions   Pending Prescriptions Disp Refills    OLMESARTAN MEDOXOMIL 40 MG Oral Tab [Pharmacy Med Name: OLMESARTAN MEDOXOMIL 40 MG TAB] 90 tablet 1     Sig: TAKE ONE TABLET BY MOUTH DAILY       Hypertensive Medications Protocol Failed - 4/7/2023  5:04 AM        Failed - CMP or BMP in past 6 months     No results found for this or any previous visit (from the past 4392 hour(s)).             Passed - In person appointment in the past 12 or next 3 months     Recent Outpatient Visits              1 week ago Primary hypertension    [address], [address] Rai MD    Office Visit    7 months ago Annual physical exam    [address], Amanda Rai MD    Office Visit    1 year ago Primary hypertension    [address], Amanda Rai MD    Office Visit    1 year ago Primary hypertension Jackson Holloway MD    Office Visit    1 year ago Wellington Bryant MD    Office Visit          Future Appointments         Provider Department Appt Notes    In 4 months Sherrie Camacho MD [address], [address] BP reading less than 140/90     BP Readings from Last 1 Encounters:  03/31/23 : 139/74              Passed - In person appointment or virtual visit in the past 6 months     Recent Outpatient Visits              1 week ago Primary hypertension    [address], Wellington Leyva MD    Office Visit    7 months ago Annual physical exam    [address], Wellington Leyva MD    Office Visit    1 year ago Primary hypertension    [address], Wellington Leyva MD    Office Visit    1 year ago Primary hypertension    [address], Wellington Leyva MD    Office Visit    1 year ago Wellington Bryant MD    Office Visit          Future Appointments         Provider Department Appt Notes    In 4 months Sherrie Camacho MD [address], [address], [facility] or GFRNAA > 50     GFR Evaluation  EGFRCR: 58 , resulted on 8/26/2022

## 2023-07-21 RX ORDER — HYDROCHLOROTHIAZIDE 25 MG/1
25 TABLET ORAL DAILY
Qty: 90 TABLET | Refills: 3 | Status: SHIPPED | OUTPATIENT
Start: 2023-07-21

## 2023-07-21 NOTE — TELEPHONE ENCOUNTER
Please review protocol failed/ No protocol    Requested Prescriptions   Pending Prescriptions Disp Refills    hydroCHLOROthiazide 25 MG Oral Tab [Pharmacy Med Name: hydroCHLOROthiazide 25 MG TABLET] 90 tablet 3     Sig: TAKE ONE TABLET BY MOUTH DAILY       Hypertensive Medications Protocol Failed - 7/21/2023  5:04 AM        Failed - CMP or BMP in past 6 months     No results found for this or any previous visit (from the past 4392 hour(s)).             Passed - In person appointment in the past 12 or next 3 months     Recent Outpatient Visits              3 months ago Primary hypertension    Stacie Naegeli, Graceann Litten, MD    Office Visit    10 months ago Annual physical exam    Stacie Naegeli, Graceann Litten, MD    Office Visit    1 year ago Primary hypertension    Stacie Naegeli, Graceann Litten, MD    Office Visit    1 year ago Primary hypertension    Stacie Naegeli, Graceann Litten, MD    Office Visit    1 year ago Dina Malta, Graceann Litten, MD    Office Visit          Future Appointments         Provider Department Appt Notes    In 3 weeks MD Aurelia Vela [facility] - 8/26/2022 LAST PX               Passed - Last BP reading less than 140/90     BP Readings from Last 1 Encounters:  03/31/23 : 139/74              Passed - In person appointment or virtual visit in the past 6 months     Recent Outpatient Visits              3 months ago Primary hypertension    Stacie Naegeli, Graceann Litten, MD    Office Visit    10 months ago Annual physical exam    Stacie Naegeli, Graceann Litten, MD    Office Visit    1 year ago Primary hypertension Phillip Cole MD    Office Visit    1 year ago Primary hypertension    Ceasar Rivera MD    Office Visit    1 year ago Ceasar Vyas MD    Office Visit          Future Appointments         Provider Department Appt Notes    In 3 weeks MD Miley Adler [facility] - 8/26/2022 LAST PX               Passed - [facility] or GFRNAA > 50     GFR Evaluation  EGFRCR: 58 , resulted on 8/26/2022               Recent Outpatient Visits              3 months ago Primary hypertension    Ceasar Rivera MD    Office Visit    10 months ago Annual physical exam    Ceasar Rivera MD    Office Visit    1 year ago Primary hypertension    eCasar Rivera MD    Office Visit    1 year ago Primary hypertension    Ceasar Rivera MD    Office Visit    1 year ago Ceasar Vyas MD    Office Visit            Future Appointments         Provider Department Appt Notes    In 3 weeks [facility] LAST Marlon Asher

## 2023-08-11 ENCOUNTER — LAB ENCOUNTER (OUTPATIENT)
Dept: LAB | Facility: REFERENCE LAB | Age: 83
End: 2023-08-11
Attending: INTERNAL MEDICINE
Payer: MEDICARE

## 2023-08-11 ENCOUNTER — OFFICE VISIT (OUTPATIENT)
Dept: INTERNAL MEDICINE CLINIC | Facility: CLINIC | Age: 83
End: 2023-08-11

## 2023-08-11 VITALS
BODY MASS INDEX: 36.53 KG/M2 | HEART RATE: 64 BPM | WEIGHT: 174 LBS | HEIGHT: 58 IN | DIASTOLIC BLOOD PRESSURE: 70 MMHG | TEMPERATURE: 98 F | SYSTOLIC BLOOD PRESSURE: 130 MMHG | OXYGEN SATURATION: 97 %

## 2023-08-11 DIAGNOSIS — Z00.00 ENCOUNTER FOR ANNUAL HEALTH EXAMINATION: Primary | ICD-10-CM

## 2023-08-11 DIAGNOSIS — F32.0 CURRENT MILD EPISODE OF MAJOR DEPRESSIVE DISORDER, UNSPECIFIED WHETHER RECURRENT (HCC): ICD-10-CM

## 2023-08-11 DIAGNOSIS — Z00.00 ENCOUNTER FOR ANNUAL HEALTH EXAMINATION: ICD-10-CM

## 2023-08-11 DIAGNOSIS — Z12.31 ENCOUNTER FOR SCREENING MAMMOGRAM FOR MALIGNANT NEOPLASM OF BREAST: ICD-10-CM

## 2023-08-11 PROBLEM — G56.03 BILATERAL CARPAL TUNNEL SYNDROME: Status: ACTIVE | Noted: 2017-11-09

## 2023-08-11 LAB
ALBUMIN SERPL-MCNC: 3.7 G/DL (ref 3.4–5)
ALBUMIN/GLOB SERPL: 0.9 {RATIO} (ref 1–2)
ALP LIVER SERPL-CCNC: 75 U/L
ALT SERPL-CCNC: 19 U/L
ANION GAP SERPL CALC-SCNC: 6 MMOL/L (ref 0–18)
AST SERPL-CCNC: 11 U/L (ref 15–37)
BASOPHILS # BLD AUTO: 0.05 X10(3) UL (ref 0–0.2)
BASOPHILS NFR BLD AUTO: 0.7 %
BILIRUB SERPL-MCNC: 0.5 MG/DL (ref 0.1–2)
BUN BLD-MCNC: 29 MG/DL (ref 7–18)
BUN/CREAT SERPL: 29.6 (ref 10–20)
CALCIUM BLD-MCNC: 9.1 MG/DL (ref 8.5–10.1)
CHLORIDE SERPL-SCNC: 107 MMOL/L (ref 98–112)
CHOLEST SERPL-MCNC: 194 MG/DL (ref ?–200)
CO2 SERPL-SCNC: 28 MMOL/L (ref 21–32)
CREAT BLD-MCNC: 0.98 MG/DL
DEPRECATED RDW RBC AUTO: 41.8 FL (ref 35.1–46.3)
EGFRCR SERPLBLD CKD-EPI 2021: 58 ML/MIN/1.73M2 (ref 60–?)
EOSINOPHIL # BLD AUTO: 0.18 X10(3) UL (ref 0–0.7)
EOSINOPHIL NFR BLD AUTO: 2.6 %
ERYTHROCYTE [DISTWIDTH] IN BLOOD BY AUTOMATED COUNT: 13.1 % (ref 11–15)
EST. AVERAGE GLUCOSE BLD GHB EST-MCNC: 117 MG/DL (ref 68–126)
FASTING PATIENT LIPID ANSWER: YES
FASTING STATUS PATIENT QL REPORTED: YES
GLOBULIN PLAS-MCNC: 4 G/DL (ref 2.8–4.4)
GLUCOSE BLD-MCNC: 100 MG/DL (ref 70–99)
HBA1C MFR BLD: 5.7 % (ref ?–5.7)
HCT VFR BLD AUTO: 38.3 %
HDLC SERPL-MCNC: 72 MG/DL (ref 40–59)
HGB BLD-MCNC: 12.5 G/DL
IMM GRANULOCYTES # BLD AUTO: 0.01 X10(3) UL (ref 0–1)
IMM GRANULOCYTES NFR BLD: 0.1 %
LDLC SERPL CALC-MCNC: 102 MG/DL (ref ?–100)
LYMPHOCYTES # BLD AUTO: 1.45 X10(3) UL (ref 1–4)
LYMPHOCYTES NFR BLD AUTO: 20.6 %
MCH RBC QN AUTO: 28.3 PG (ref 26–34)
MCHC RBC AUTO-ENTMCNC: 32.6 G/DL (ref 31–37)
MCV RBC AUTO: 86.7 FL
MONOCYTES # BLD AUTO: 0.48 X10(3) UL (ref 0.1–1)
MONOCYTES NFR BLD AUTO: 6.8 %
NEUTROPHILS # BLD AUTO: 4.86 X10 (3) UL (ref 1.5–7.7)
NEUTROPHILS # BLD AUTO: 4.86 X10(3) UL (ref 1.5–7.7)
NEUTROPHILS NFR BLD AUTO: 69.2 %
NONHDLC SERPL-MCNC: 122 MG/DL (ref ?–130)
OSMOLALITY SERPL CALC.SUM OF ELEC: 298 MOSM/KG (ref 275–295)
PLATELET # BLD AUTO: 187 10(3)UL (ref 150–450)
POTASSIUM SERPL-SCNC: 4 MMOL/L (ref 3.5–5.1)
PROT SERPL-MCNC: 7.7 G/DL (ref 6.4–8.2)
RBC # BLD AUTO: 4.42 X10(6)UL
SODIUM SERPL-SCNC: 141 MMOL/L (ref 136–145)
TRIGL SERPL-MCNC: 116 MG/DL (ref 30–149)
TSI SER-ACNC: 1.84 MIU/ML (ref 0.36–3.74)
VLDLC SERPL CALC-MCNC: 19 MG/DL (ref 0–30)
WBC # BLD AUTO: 7 X10(3) UL (ref 4–11)

## 2023-08-11 PROCEDURE — 36415 COLL VENOUS BLD VENIPUNCTURE: CPT

## 2023-08-11 PROCEDURE — 1125F AMNT PAIN NOTED PAIN PRSNT: CPT | Performed by: INTERNAL MEDICINE

## 2023-08-11 PROCEDURE — 85025 COMPLETE CBC W/AUTO DIFF WBC: CPT

## 2023-08-11 PROCEDURE — G0439 PPPS, SUBSEQ VISIT: HCPCS | Performed by: INTERNAL MEDICINE

## 2023-08-11 PROCEDURE — 84443 ASSAY THYROID STIM HORMONE: CPT

## 2023-08-11 PROCEDURE — 80061 LIPID PANEL: CPT

## 2023-08-11 PROCEDURE — 83036 HEMOGLOBIN GLYCOSYLATED A1C: CPT

## 2023-08-11 PROCEDURE — 80053 COMPREHEN METABOLIC PANEL: CPT

## 2023-08-11 RX ORDER — MELATONIN
1000 DAILY
COMMUNITY

## 2023-08-11 RX ORDER — MULTIVIT WITH MINERALS/LUTEIN
1000 TABLET ORAL DAILY
COMMUNITY

## 2023-09-25 ENCOUNTER — MED REC SCAN ONLY (OUTPATIENT)
Dept: INTERNAL MEDICINE CLINIC | Facility: CLINIC | Age: 83
End: 2023-09-25

## 2023-10-26 ENCOUNTER — OFFICE VISIT (OUTPATIENT)
Dept: INTERNAL MEDICINE CLINIC | Facility: CLINIC | Age: 83
End: 2023-10-26

## 2023-10-26 VITALS
BODY MASS INDEX: 36.94 KG/M2 | HEART RATE: 62 BPM | WEIGHT: 176 LBS | OXYGEN SATURATION: 100 % | DIASTOLIC BLOOD PRESSURE: 80 MMHG | TEMPERATURE: 98 F | SYSTOLIC BLOOD PRESSURE: 138 MMHG | HEIGHT: 58 IN

## 2023-10-26 DIAGNOSIS — Z01.810 PREOP CARDIOVASCULAR EXAM: ICD-10-CM

## 2023-10-26 DIAGNOSIS — Z01.818 PREOP EXAM FOR INTERNAL MEDICINE: Primary | ICD-10-CM

## 2023-10-26 PROCEDURE — 1126F AMNT PAIN NOTED NONE PRSNT: CPT | Performed by: INTERNAL MEDICINE

## 2023-10-26 PROCEDURE — 93000 ELECTROCARDIOGRAM COMPLETE: CPT | Performed by: INTERNAL MEDICINE

## 2023-10-26 PROCEDURE — 99213 OFFICE O/P EST LOW 20 MIN: CPT | Performed by: INTERNAL MEDICINE

## 2023-10-27 LAB
ATRIAL RATE: 62 BPM
P AXIS: 15 DEGREES
P-R INTERVAL: 134 MS
Q-T INTERVAL: 422 MS
QRS DURATION: 86 MS
QTC CALCULATION (BEZET): 428 MS
R AXIS: -20 DEGREES
T AXIS: 6 DEGREES
VENTRICULAR RATE: 62 BPM

## 2023-10-31 ENCOUNTER — TELEPHONE (OUTPATIENT)
Dept: INTERNAL MEDICINE CLINIC | Facility: CLINIC | Age: 83
End: 2023-10-31

## 2023-10-31 NOTE — TELEPHONE ENCOUNTER
Patient called stated [facility] is still waiting on Pre-op Clearance    Then will need EKG also    Fax to [facility] at [phone]

## 2024-02-09 ENCOUNTER — HOSPITAL ENCOUNTER (OUTPATIENT)
Dept: MAMMOGRAPHY | Facility: HOSPITAL | Age: 84
Discharge: HOME | End: 2024-02-09
Attending: INTERNAL MEDICINE
Payer: MEDICARE

## 2024-02-09 DIAGNOSIS — Z12.31 ENCOUNTER FOR SCREENING MAMMOGRAM FOR MALIGNANT NEOPLASM OF BREAST: ICD-10-CM

## 2024-02-09 PROCEDURE — 77063 BREAST TOMOSYNTHESIS BI: CPT | Performed by: INTERNAL MEDICINE

## 2024-02-09 PROCEDURE — 77067 SCR MAMMO BI INCL CAD: CPT | Performed by: INTERNAL MEDICINE

## 2024-02-26 RX ORDER — SIMVASTATIN 40 MG
40 TABLET ORAL EVERY EVENING
Qty: 90 TABLET | Refills: 3 | Status: SHIPPED | OUTPATIENT
Start: 2024-02-26

## 2024-02-26 NOTE — TELEPHONE ENCOUNTER
Refill passed per protocol.    Requested Prescriptions   Pending Prescriptions Disp Refills    SIMVASTATIN 40 MG Oral Tab [Pharmacy Med Name: SIMVASTATIN 40 MG TABLET] 90 tablet 3     Sig: TAKE ONE TABLET BY MOUTH EVERY EVENING       Cholesterol Medication Protocol Passed - 2/26/2024  5:04 AM        Passed - ALT < 80     Lab Results   Component Value Date    ALT 19 08/11/2023             Passed - ALT resulted within past year        Passed - Lipid panel within past 12 months     Lab Results   Component Value Date    CHOLEST 194 08/11/2023    TRIG 116 08/11/2023    HDL 72 (H) 08/11/2023     (H) 08/11/2023    VLDL 19 08/11/2023    NONHDLC 122 08/11/2023             Passed - In person appointment or virtual visit in the past 12 mos or appointment in next 3 mos     Recent Outpatient Visits              4 months ago Preop exam for internal medicine    [facility], Hillary Lombardi Arlinda, MD    Office Visit    6 months ago Encounter for annual health examination    [facility], Hillary Lombardi Arlinda, MD    Office Visit    11 months ago Primary hypertension    [facility], Hillary Lombardi Arlinda, MD    Office Visit    1 year ago Annual physical exam    [facility], Hillary Lombardi Arlinda, MD    Office Visit    1 year ago Primary hypertension    [facility], Hillary Lombardi Arlinda, MD    Office Visit                             Recent Outpatient Visits              4 months ago Preop exam for internal medicine    [facility], Hillary Lombardi Arlinda, MD    Office Visit    6 months ago Encounter for annual health examination    [facility], Hillary Lombardi Arlinda, MD    Office Visit    11 months ago Primary hypertension    [facility], [place],  Fany Goyal MD    Office Visit    1 year ago Annual physical exam    [facility], Hillary Lombardi Arlinda, MD    Office Visit    1 year ago Primary hypertension    [facility], Hillary Lombardi Arlinda, MD    Office Visit

## 2024-04-04 RX ORDER — OLMESARTAN MEDOXOMIL 40 MG/1
40 TABLET ORAL DAILY
Qty: 90 TABLET | Refills: 3 | Status: SHIPPED | OUTPATIENT
Start: 2024-04-04

## 2024-04-04 NOTE — TELEPHONE ENCOUNTER
Refill passed per [facility] protocol.    Requested Prescriptions   Pending Prescriptions Disp Refills    OLMESARTAN MEDOXOMIL 40 MG Oral Tab [Pharmacy Med Name: OLMESARTAN MEDOXOMIL 40 MG TAB] 90 tablet 3     Sig: TAKE ONE TABLET BY MOUTH DAILY       Hypertension Medications Protocol Passed - 4/2/2024  5:04 AM        Passed - CMP or BMP in past 12 months        Passed - Last BP reading less than 140/90     BP Readings from Last 1 Encounters:   10/26/23 138/80               Passed - In person appointment or virtual visit in the past 12 mos or appointment in next 3 mos     Recent Outpatient Visits              5 months ago Preop exam for internal medicine    [facility], Hillary Lombardi Arlinda, MD    Office Visit    7 months ago Encounter for annual health examination    [facility], Hillary Lombardi Arlinda, MD    Office Visit    1 year ago Primary hypertension    [facility], Hillary Lombardi Arlinda, MD    Office Visit    1 year ago [facility]    Office Visit    2 years ago Primary hypertension    [facility], Hillary Lombardi Arlinda, MD    Office Visit                      Passed - EGFRCR or GFRNAA > 50     GFR Evaluation  EGFRCR: 58 , resulted on 8/11/2023               Recent Outpatient Visits              5 months ago Preop exam for internal medicine    [facility], Hillary Lombardi Arlinda, MD    Office Visit    7 months ago Encounter for annual health examination    [facility], Hillary Lombardi Arlinda, MD    Office Visit    1 year ago Primary hypertension    [facility]    Office Visit    1 year ago Annual physical exam    [facility], Hillary Lombardi Arlinda, MD    Office Visit    2 years ago Primary hypertension    [facility], Hillary Lombardi Arlinda, MD    Office Visit

## 2024-05-16 RX ORDER — CITALOPRAM HYDROBROMIDE 10 MG/1
10 TABLET ORAL DAILY
Qty: 90 TABLET | Refills: 3 | Status: SHIPPED | OUTPATIENT
Start: 2024-05-16

## 2024-05-16 NOTE — TELEPHONE ENCOUNTER
Please review. Protocol Failed; No Protocol    Requested Prescriptions   Pending Prescriptions Disp Refills    CITALOPRAM 10 MG Oral Tab [Pharmacy Med Name: CITALOPRAM HBR 10 MG TABLET] 90 tablet 3     Sig: TAKE 1 TABLET BY MOUTH DAILY       Psychiatric Non-Scheduled (Anti-Anxiety) Failed - 5/14/2024  5:04 AM        Failed - In person appointment or virtual visit in the past 6 mos or appointment in next 3 mos     Recent Outpatient Visits              6 months ago Preop exam for internal medicine    [facility], Hillary Lombardi Arlinda, MD    Office Visit    9 months ago Encounter for annual health examination    [facility], Hillary Lombardi Arlinda, MD    Office Visit    1 year ago Primary hypertension    [facility]    Office Visit    1 year ago Annual physical exam    [facility]    Office Visit    2 years ago Primary hypertension    [facility], Hillary Lombardi Arlinda, MD    Office Visit                      Passed - Depression Screening completed within the past 12 months                 Recent Outpatient Visits              6 months ago Preop exam for internal medicine    [facility], Hillary Lombardi Arlinda, MD    Office Visit    9 months ago Encounter for annual health examination    [facility], Hillary Lombardi Arlinda, MD    Office Visit    1 year ago Primary hypertension    [facility]    Office Visit    1 year ago Annual physical exam    [facility]    Office Visit    2 years ago Primary hypertension    [facility]     Office Visit

## 2024-07-18 RX ORDER — HYDROCHLOROTHIAZIDE 25 MG/1
25 TABLET ORAL DAILY
Qty: 90 TABLET | Refills: 3 | Status: SHIPPED | OUTPATIENT
Start: 2024-07-18

## 2024-09-05 ENCOUNTER — OFFICE VISIT (OUTPATIENT)
Dept: INTERNAL MEDICINE CLINIC | Facility: CLINIC | Age: 84
End: 2024-09-05

## 2024-09-05 VITALS
OXYGEN SATURATION: 97 % | WEIGHT: 181 LBS | HEIGHT: 58 IN | HEART RATE: 65 BPM | DIASTOLIC BLOOD PRESSURE: 65 MMHG | TEMPERATURE: 98 F | BODY MASS INDEX: 37.99 KG/M2 | SYSTOLIC BLOOD PRESSURE: 155 MMHG

## 2024-09-05 DIAGNOSIS — Z00.00 ENCOUNTER FOR ANNUAL HEALTH EXAMINATION: ICD-10-CM

## 2024-09-05 DIAGNOSIS — Z12.31 ENCOUNTER FOR SCREENING MAMMOGRAM FOR MALIGNANT NEOPLASM OF BREAST: ICD-10-CM

## 2024-09-05 DIAGNOSIS — Z00.00 MEDICARE ANNUAL WELLNESS VISIT, SUBSEQUENT: Primary | ICD-10-CM

## 2024-09-05 DIAGNOSIS — Z78.0 MENOPAUSE: ICD-10-CM

## 2024-09-05 DIAGNOSIS — M65.331 TRIGGER MIDDLE FINGER OF RIGHT HAND: ICD-10-CM

## 2024-09-05 DIAGNOSIS — E55.9 VITAMIN D DEFICIENCY: ICD-10-CM

## 2024-09-05 PROBLEM — G56.03 BILATERAL CARPAL TUNNEL SYNDROME: Status: RESOLVED | Noted: 2017-11-09 | Resolved: 2024-09-05

## 2024-09-05 PROCEDURE — G0439 PPPS, SUBSEQ VISIT: HCPCS | Performed by: INTERNAL MEDICINE

## 2024-09-05 NOTE — PROGRESS NOTES
Subjective:   Amberly Michael is a 84 year old female who presents for a Medicare Subsequent Annual Wellness visit (Pt already had Initial Annual Wellness) and scheduled follow up of multiple significant but stable problems.     She is also complaining of trigger finger of her right middle finger of the right hand  History/Other:   Fall Risk Assessment:   She has been screened for Falls and is low risk.      Cognitive Assessment:   She had a completely normal cognitive assessment - see flowsheet entries     Functional Ability/Status:   Amberly Michael has some abnormal functions as listed below:  She has Vision problems based on screening of functional status.       Depression Screening (PHQ):  PHQ-2 SCORE: 0  , done 9/5/2024   Last [place] Suicide Screening on 9/5/2024 was No Risk.     5 minutes spent screening and counseling for depression    Advanced Directives:   She does have a Living Will but we do NOT have it on file in Epic.    She does have a POA but we do NOT have it on file in Epic.    Discussed Advance Care Planning with patient (and family/surrogate if present). Standard forms made available to patient in After Visit Summary.      Patient Active Problem List   Diagnosis    Essential hypertension    Hypercholesterolemia    Anxiety    Overflow incontinence of urine    Current mild episode of major depressive disorder, unspecified whether recurrent (HCC)    Bilateral carpal tunnel syndrome    Dysthymic disorder    Female stress incontinence    GERD (gastroesophageal reflux disease)    Intertrigo    Overactive bladder    Peripheral edema     Allergies:  She has No Known Allergies.    Current Medications:  Outpatient Medications Marked as Taking for the 9/5/24 encounter (Office Visit) with Fany Morrissey MD   Medication Sig    hydroCHLOROthiazide 25 MG Oral Tab Take 1 tablet (25 mg total) by mouth daily.    citalopram 10 MG Oral Tab Take 1 tablet (10 mg total) by mouth daily.    Olmesartan Medoxomil 40 MG  Oral Tab Take 1 tablet (40 mg total) by mouth daily.    simvastatin 40 MG Oral Tab Take 1 tablet (40 mg total) by mouth every evening.    Ascorbic Acid (VITAMIN C) 1000 MG Oral Tab Take 1 tablet (1,000 mg total) by mouth daily.    cholecalciferol 25 MCG (1000 UT) Oral Tab Take 1 tablet (1,000 Units total) by mouth daily.    Esomeprazole Magnesium 20 MG Oral Capsule Delayed Release Take 1 capsule (20 mg total) by mouth every morning before breakfast.       Medical History:  She  has a past medical history of Anxiety, Current mild episode of major depressive disorder, unspecified whether recurrent (HCC) (8/26/2022), Essential hypertension, GERD (gastroesophageal reflux disease) (10/6/2011), and Hypercholesterolemia.  Surgical History:  She  has no past surgical history on file.   Family History:  Her family history includes Heart Disease in her father; No Known Problems in her sister; myelomas in her mother.  Social History:  She  reports that she has never smoked. She has never used smokeless tobacco. She reports that she does not currently use drugs. She reports that she does not drink alcohol.    Tobacco:  She has never smoked tobacco.    CAGE Alcohol Screen:   CAGE screening score of 0 on 9/5/2024, showing low risk of alcohol abuse.      Patient Care Team:  Fany Morrissey MD as PCP - General (Internal Medicine)  Briseyda Loo PT as Physical Therapist (Physical Therapy)    Review of Systems  GENERAL: feels well otherwise  SKIN: denies any unusual skin lesions  EYES: denies blurred vision or double vision  HEENT: denies nasal congestion, sinus pain or ST  LUNGS: denies shortness of breath with exertion  CARDIOVASCULAR: denies chest pain on exertion  GI: denies abdominal pain, denies heartburn  : denies dysuria, vaginal discharge or itching, no complaint of urinary incontinence   MUSCULOSKELETAL: denies back pain  NEURO: denies headaches  PSYCHE: denies depression or anxiety  HEMATOLOGIC: denies hx of  anemia  ENDOCRINE: denies thyroid history  ALL/ASTHMA: denies hx of allergy or asthma    Objective:   Physical Exam  General Appearance:  Alert, cooperative, no distress, appears stated age   Head:  Normocephalic, without obvious abnormality, atraumatic   Eyes:  PERRL, conjunctiva/corneas clear, EOM's intact both eyes   Ears:  Normal TM's and external ear canals, both ears   Nose: Nares normal, septum midline,mucosa normal, no drainage or sinus tenderness   Throat: Lips, mucosa, and tongue normal; teeth and gums normal   Neck: Supple, symmetrical, trachea midline, no adenopathy;  thyroid: not enlarged, symmetric, no tenderness/mass/nodules; no carotid bruit or JVD   Back:   Symmetric, no curvature, ROM normal, no CVA tenderness   Lungs:   Clear to auscultation bilaterally, respirations unlabored   Heart:  Regular rate and rhythm, S1 and S2 normal, no murmur, rub, or gallop   Abdomen:   Soft, non-tender, bowel sounds active all four quadrants,  no masses, no organomegaly   Pelvic: Deferred   Extremities: Extremities normal, atraumatic, no cyanosis or edema   Pulses: 2+ and symmetric   Skin: Skin color, texture, turgor normal, no rashes or lesions   Lymph nodes: Cervical, supraclavicular, and axillary nodes normal   Neurologic: Normal       BP (!) 166/61 (BP Location: Left arm, Patient Position: Sitting, Cuff Size: adult)   Pulse 65   Temp 97.9 °F (36.6 °C) (Temporal)   Ht 4' 10\" (1.473 m)   Wt 181 lb (82.1 kg)   SpO2 97%   BMI 37.83 kg/m²  Estimated body mass index is 37.83 kg/m² as calculated from the following:    Height as of this encounter: 4' 10\" (1.473 m).    Weight as of this encounter: 181 lb (82.1 kg).    Medicare Hearing Assessment:   Hearing Screening    Screening Method: Questionnaire  I have a problem hearing over the telephone: No I have trouble following the conversations when two or more people are talking at the same time: No   I have trouble understanding things on the TV: No I have to strain  to understand conversations: No   I have to worry about missing the telephone ring or doorbell: No I have trouble hearing conversations in a noisy background such as a crowded room or restaurant: No   I get confused about where sounds come from: No I misunderstand some words in a sentence and need to ask people to repeat themselves: No   I especially have trouble understanding the speech of women and children: No I have trouble understanding the speaker in a large room such as at a meeting or place of Christianity: No   Many people I talk to seem to mumble (or don't speak clearly): No People get annoyed because I misunderstand what they say: No   I misunderstand what others are saying and make inappropriate responses: No I avoid social activities because I cannot hear well and fear I will reply improperly: No   Family members and friends have told me they think I may have hearing loss: No             Visual Acuity:     Right Eye Chart Acuity: 20/30     Left Eye Chart Acuity: 20/30     Both Eyes Chart Acuity: 20/30            Assessment & Plan:   Ambelry Michael is a 84 year old female who presents for a Medicare Assessment.     1. Medicare annual wellness visit, subsequent (Primary)  2. Encounter for annual health examination  3.  Hypertension blood pressure noted on the higher side but she is also very anxious continue with current medication monitor blood pressure at home and follow-up with 2 weeks  4 hypercholesterolemia continue with statin will check lipid panel  6 anxiety stable  7.  Current mild episode of major depressive disorder, unspecified whether recurrent stable continue with current medication  8 menopause will order bone density scan  9 overflow incontinence of urine stable  10.Gerd stable continue with PPI    11.  Trigger middle finger of right hand I will send her to see hands pressure   The patient indicates understanding of these issues and agrees to the plan.  Reinforced healthy diet, lifestyle, and  exercise.      No follow-ups on file.     BALJIT HAQ MD, 9/5/2024     Supplementary Documentation:   General Health:  In the past six months, have you lost more than 10 pounds without trying?: 2 - No  Has your appetite been poor?: No  Type of Diet: Other (none)  How does the patient maintain a good energy level?: Daily Walks  How would you describe your daily physical activity?: Light  How would you describe your current health state?: Good  How do you maintain positive mental well-being?: Social Interaction;Games;Visiting Family  On a scale of 0 to 10, with 0 being no pain and 10 being severe pain, what is your pain level?: 4 - (Moderate)  In the past six months, have you experienced urine leakage?: 1-Yes  At any time do you feel concerned for the safety/well-being of yourself and/or your children, in your home or elsewhere?: No  Have you had any immunizations at another office such as Influenza, Hepatitis B, Tetanus, or Pneumococcal?: No    Health Maintenance   Topic Date Due    Annual Physical  08/11/2024    COVID-19 Vaccine (4 - 2023-24 season) 09/01/2024    Influenza Vaccine (1) 10/01/2024    Mammogram  02/09/2025    DEXA Scan  Completed    Annual Depression Screening  Completed    Fall Risk Screening (Annual)  Completed    Pneumococcal Vaccine: 65+ Years  Completed    Zoster Vaccines  Completed

## 2024-09-19 ENCOUNTER — OFFICE VISIT (OUTPATIENT)
Dept: INTERNAL MEDICINE CLINIC | Facility: CLINIC | Age: 84
End: 2024-09-19

## 2024-09-19 ENCOUNTER — LAB ENCOUNTER (OUTPATIENT)
Dept: LAB | Age: 84
End: 2024-09-19
Attending: INTERNAL MEDICINE
Payer: MEDICARE

## 2024-09-19 VITALS
HEIGHT: 58 IN | WEIGHT: 178 LBS | DIASTOLIC BLOOD PRESSURE: 70 MMHG | SYSTOLIC BLOOD PRESSURE: 135 MMHG | OXYGEN SATURATION: 100 % | HEART RATE: 70 BPM | BODY MASS INDEX: 37.36 KG/M2 | TEMPERATURE: 98 F

## 2024-09-19 DIAGNOSIS — Z00.00 MEDICARE ANNUAL WELLNESS VISIT, SUBSEQUENT: ICD-10-CM

## 2024-09-19 DIAGNOSIS — I10 PRIMARY HYPERTENSION: Primary | ICD-10-CM

## 2024-09-19 DIAGNOSIS — E55.9 VITAMIN D DEFICIENCY: ICD-10-CM

## 2024-09-19 LAB
ALBUMIN SERPL-MCNC: 4.5 G/DL (ref 3.2–4.8)
ALBUMIN/GLOB SERPL: 1.5 {RATIO} (ref 1–2)
ALP LIVER SERPL-CCNC: 67 U/L
ALT SERPL-CCNC: 12 U/L
ANION GAP SERPL CALC-SCNC: 10 MMOL/L (ref 0–18)
AST SERPL-CCNC: 16 U/L (ref ?–34)
BASOPHILS # BLD AUTO: 0.06 X10(3) UL (ref 0–0.2)
BASOPHILS NFR BLD AUTO: 1 %
BILIRUB SERPL-MCNC: 0.5 MG/DL (ref 0.2–1.1)
BUN BLD-MCNC: 34 MG/DL (ref 9–23)
BUN/CREAT SERPL: 33 (ref 10–20)
CALCIUM BLD-MCNC: 10.1 MG/DL (ref 8.7–10.4)
CHLORIDE SERPL-SCNC: 104 MMOL/L (ref 98–112)
CHOLEST SERPL-MCNC: 184 MG/DL (ref ?–200)
CO2 SERPL-SCNC: 26 MMOL/L (ref 21–32)
CREAT BLD-MCNC: 1.03 MG/DL
DEPRECATED RDW RBC AUTO: 41.7 FL (ref 35.1–46.3)
EGFRCR SERPLBLD CKD-EPI 2021: 54 ML/MIN/1.73M2 (ref 60–?)
EOSINOPHIL # BLD AUTO: 0.15 X10(3) UL (ref 0–0.7)
EOSINOPHIL NFR BLD AUTO: 2.4 %
ERYTHROCYTE [DISTWIDTH] IN BLOOD BY AUTOMATED COUNT: 13.1 % (ref 11–15)
EST. AVERAGE GLUCOSE BLD GHB EST-MCNC: 126 MG/DL (ref 68–126)
FASTING PATIENT LIPID ANSWER: YES
FASTING STATUS PATIENT QL REPORTED: YES
GLOBULIN PLAS-MCNC: 3.1 G/DL (ref 2–3.5)
GLUCOSE BLD-MCNC: 90 MG/DL (ref 70–99)
HBA1C MFR BLD: 6 % (ref ?–5.7)
HCT VFR BLD AUTO: 37.4 %
HDLC SERPL-MCNC: 63 MG/DL (ref 40–59)
HGB BLD-MCNC: 12.5 G/DL
IMM GRANULOCYTES # BLD AUTO: 0.02 X10(3) UL (ref 0–1)
IMM GRANULOCYTES NFR BLD: 0.3 %
LDLC SERPL CALC-MCNC: 96 MG/DL (ref ?–100)
LYMPHOCYTES # BLD AUTO: 1.43 X10(3) UL (ref 1–4)
LYMPHOCYTES NFR BLD AUTO: 22.8 %
MCH RBC QN AUTO: 29 PG (ref 26–34)
MCHC RBC AUTO-ENTMCNC: 33.4 G/DL (ref 31–37)
MCV RBC AUTO: 86.8 FL
MONOCYTES # BLD AUTO: 0.44 X10(3) UL (ref 0.1–1)
MONOCYTES NFR BLD AUTO: 7 %
NEUTROPHILS # BLD AUTO: 4.18 X10 (3) UL (ref 1.5–7.7)
NEUTROPHILS # BLD AUTO: 4.18 X10(3) UL (ref 1.5–7.7)
NEUTROPHILS NFR BLD AUTO: 66.5 %
NONHDLC SERPL-MCNC: 121 MG/DL (ref ?–130)
OSMOLALITY SERPL CALC.SUM OF ELEC: 297 MOSM/KG (ref 275–295)
PLATELET # BLD AUTO: 220 10(3)UL (ref 150–450)
POTASSIUM SERPL-SCNC: 4 MMOL/L (ref 3.5–5.1)
PROT SERPL-MCNC: 7.6 G/DL (ref 5.7–8.2)
RBC # BLD AUTO: 4.31 X10(6)UL
SODIUM SERPL-SCNC: 140 MMOL/L (ref 136–145)
TRIGL SERPL-MCNC: 143 MG/DL (ref 30–149)
TSI SER-ACNC: 2.61 MIU/ML (ref 0.55–4.78)
VIT D+METAB SERPL-MCNC: 43 NG/ML (ref 30–100)
VLDLC SERPL CALC-MCNC: 24 MG/DL (ref 0–30)
WBC # BLD AUTO: 6.3 X10(3) UL (ref 4–11)

## 2024-09-19 PROCEDURE — 36415 COLL VENOUS BLD VENIPUNCTURE: CPT

## 2024-09-19 PROCEDURE — 83036 HEMOGLOBIN GLYCOSYLATED A1C: CPT

## 2024-09-19 PROCEDURE — 80053 COMPREHEN METABOLIC PANEL: CPT

## 2024-09-19 PROCEDURE — 99213 OFFICE O/P EST LOW 20 MIN: CPT | Performed by: INTERNAL MEDICINE

## 2024-09-19 PROCEDURE — 84443 ASSAY THYROID STIM HORMONE: CPT

## 2024-09-19 PROCEDURE — 85025 COMPLETE CBC W/AUTO DIFF WBC: CPT

## 2024-09-19 PROCEDURE — 80061 LIPID PANEL: CPT

## 2024-09-19 PROCEDURE — 82306 VITAMIN D 25 HYDROXY: CPT

## 2024-09-19 NOTE — PROGRESS NOTES
Subjective:     Patient ID: Amberly Michael is a 84 year old female.    Hypertension        History/Other:   She came in today for follow-up on her blood pressure.  According to the patient since she cut down on salt her blood pressure is better controlled.  She is taking medication regularly.  Review of Systems   Constitutional: Negative.    HENT: Negative.     Respiratory: Negative.     Cardiovascular: Negative.    Gastrointestinal: Negative.    Genitourinary: Negative.    Musculoskeletal: Negative.    Skin: Negative.    Neurological: Negative.    Hematological: Negative.    Psychiatric/Behavioral: Negative.       Current Outpatient Medications   Medication Sig Dispense Refill    hydroCHLOROthiazide 25 MG Oral Tab Take 1 tablet (25 mg total) by mouth daily. 90 tablet 3    citalopram 10 MG Oral Tab Take 1 tablet (10 mg total) by mouth daily. 90 tablet 3    Olmesartan Medoxomil 40 MG Oral Tab Take 1 tablet (40 mg total) by mouth daily. 90 tablet 3    simvastatin 40 MG Oral Tab Take 1 tablet (40 mg total) by mouth every evening. 90 tablet 3    Ascorbic Acid (VITAMIN C) 1000 MG Oral Tab Take 1 tablet (1,000 mg total) by mouth daily.      cholecalciferol 25 MCG (1000 UT) Oral Tab Take 1 tablet (1,000 Units total) by mouth daily.      Esomeprazole Magnesium 20 MG Oral Capsule Delayed Release Take 1 capsule (20 mg total) by mouth every morning before breakfast. 1 capsule 0     Allergies:No Known Allergies    Past Medical History:    Anxiety    Bilateral carpal tunnel syndrome    Current mild episode of major depressive disorder, unspecified whether recurrent (HCC)    Dysthymic disorder    Essential hypertension    GERD (gastroesophageal reflux disease)    Hypercholesterolemia    Intertrigo    Peripheral edema      History reviewed. No pertinent surgical history.   Family History   Problem Relation Age of Onset    Heart Disease Father     Other (myelomas) Mother     No Known Problems Sister       Social History:   Social  History     Socioeconomic History    Marital status:    Tobacco Use    Smoking status: Never    Smokeless tobacco: Never   Vaping Use    Vaping status: Never Used   Substance and Sexual Activity    Alcohol use: Never    Drug use: Not Currently     Social Determinants of Health      [facility]    Social Connections    [facility]    Housing Stability        Objective:   Physical Exam  Vitals and nursing note reviewed.   Constitutional:       Appearance: Normal appearance.   HENT:      Head: Normocephalic and atraumatic.   Cardiovascular:      Rate and Rhythm: Normal rate and regular rhythm.      Pulses: Normal pulses.      Heart sounds: Normal heart sounds.   Pulmonary:      Effort: Pulmonary effort is normal.      Breath sounds: Normal breath sounds.   Abdominal:      Palpations: Abdomen is soft.   Musculoskeletal:         General: Normal range of motion.      Cervical back: Normal range of motion and neck supple.   Skin:     General: Skin is warm.   Neurological:      Mental Status: She is alert. Mental status is at baseline.   Psychiatric:         Mood and Affect: Mood normal.         Assessment & Plan:   1. Primary hypertension    Controlled will continue with same medication, blood pressure readings at home reviewed, continue to monitor,    No orders of the defined types were placed in this encounter.      Meds This Visit:  Requested Prescriptions      No prescriptions requested or ordered in this encounter       Imaging & Referrals:  None

## 2024-10-08 ENCOUNTER — MED REC SCAN ONLY (OUTPATIENT)
Dept: INTERNAL MEDICINE CLINIC | Facility: CLINIC | Age: 84
End: 2024-10-08

## 2025-01-29 ENCOUNTER — OFFICE VISIT (OUTPATIENT)
Dept: INTERNAL MEDICINE CLINIC | Facility: CLINIC | Age: 85
End: 2025-01-29

## 2025-01-29 ENCOUNTER — HOSPITAL ENCOUNTER (OUTPATIENT)
Dept: CT IMAGING | Facility: HOSPITAL | Age: 85
Discharge: HOME | End: 2025-01-29
Attending: Nurse Practitioner
Payer: MEDICARE

## 2025-01-29 ENCOUNTER — TELEPHONE (OUTPATIENT)
Dept: INTERNAL MEDICINE CLINIC | Facility: CLINIC | Age: 85
End: 2025-01-29

## 2025-01-29 ENCOUNTER — LAB ENCOUNTER (OUTPATIENT)
Dept: LAB | Facility: HOSPITAL | Age: 85
End: 2025-01-29
Attending: Nurse Practitioner
Payer: MEDICARE

## 2025-01-29 ENCOUNTER — NURSE TRIAGE (OUTPATIENT)
Dept: INTERNAL MEDICINE CLINIC | Facility: CLINIC | Age: 85
End: 2025-01-29

## 2025-01-29 VITALS
RESPIRATION RATE: 18 BRPM | HEART RATE: 80 BPM | OXYGEN SATURATION: 98 % | WEIGHT: 178 LBS | DIASTOLIC BLOOD PRESSURE: 92 MMHG | HEIGHT: 58 IN | SYSTOLIC BLOOD PRESSURE: 178 MMHG | BODY MASS INDEX: 37.36 KG/M2

## 2025-01-29 DIAGNOSIS — I10 ELEVATED BLOOD PRESSURE READING IN OFFICE WITH DIAGNOSIS OF HYPERTENSION: ICD-10-CM

## 2025-01-29 DIAGNOSIS — R51.9 PRESSURE IN HEAD: ICD-10-CM

## 2025-01-29 DIAGNOSIS — I10 ELEVATED BLOOD PRESSURE READING IN OFFICE WITH DIAGNOSIS OF HYPERTENSION: Primary | ICD-10-CM

## 2025-01-29 DIAGNOSIS — I10 PRIMARY HYPERTENSION: ICD-10-CM

## 2025-01-29 LAB
ALBUMIN SERPL-MCNC: 5 G/DL (ref 3.2–4.8)
ALBUMIN/GLOB SERPL: 1.8 {RATIO} (ref 1–2)
ALP LIVER SERPL-CCNC: 83 U/L
ALT SERPL-CCNC: 14 U/L
ANION GAP SERPL CALC-SCNC: 10 MMOL/L (ref 0–18)
AST SERPL-CCNC: 18 U/L (ref ?–34)
BASOPHILS # BLD AUTO: 0.06 X10(3) UL (ref 0–0.2)
BASOPHILS NFR BLD AUTO: 0.7 %
BILIRUB SERPL-MCNC: 0.4 MG/DL (ref 0.2–1.1)
BUN BLD-MCNC: 27 MG/DL (ref 9–23)
BUN/CREAT SERPL: 27.8 (ref 10–20)
CALCIUM BLD-MCNC: 10.4 MG/DL (ref 8.7–10.4)
CHLORIDE SERPL-SCNC: 103 MMOL/L (ref 98–112)
CO2 SERPL-SCNC: 26 MMOL/L (ref 21–32)
CREAT BLD-MCNC: 0.97 MG/DL
DEPRECATED RDW RBC AUTO: 43.7 FL (ref 35.1–46.3)
EGFRCR SERPLBLD CKD-EPI 2021: 58 ML/MIN/1.73M2 (ref 60–?)
EOSINOPHIL # BLD AUTO: 0.09 X10(3) UL (ref 0–0.7)
EOSINOPHIL NFR BLD AUTO: 1 %
ERYTHROCYTE [DISTWIDTH] IN BLOOD BY AUTOMATED COUNT: 13.7 % (ref 11–15)
FASTING STATUS PATIENT QL REPORTED: YES
GLOBULIN PLAS-MCNC: 2.8 G/DL (ref 2–3.5)
GLUCOSE BLD-MCNC: 96 MG/DL (ref 70–99)
HCT VFR BLD AUTO: 39.2 %
HGB BLD-MCNC: 12.8 G/DL
IMM GRANULOCYTES # BLD AUTO: 0.02 X10(3) UL (ref 0–1)
IMM GRANULOCYTES NFR BLD: 0.2 %
LYMPHOCYTES # BLD AUTO: 1.25 X10(3) UL (ref 1–4)
LYMPHOCYTES NFR BLD AUTO: 14.5 %
MCH RBC QN AUTO: 28.3 PG (ref 26–34)
MCHC RBC AUTO-ENTMCNC: 32.7 G/DL (ref 31–37)
MCV RBC AUTO: 86.5 FL
MONOCYTES # BLD AUTO: 0.4 X10(3) UL (ref 0.1–1)
MONOCYTES NFR BLD AUTO: 4.7 %
NEUTROPHILS # BLD AUTO: 6.78 X10 (3) UL (ref 1.5–7.7)
NEUTROPHILS # BLD AUTO: 6.78 X10(3) UL (ref 1.5–7.7)
NEUTROPHILS NFR BLD AUTO: 78.9 %
OSMOLALITY SERPL CALC.SUM OF ELEC: 293 MOSM/KG (ref 275–295)
PLATELET # BLD AUTO: 226 10(3)UL (ref 150–450)
POTASSIUM SERPL-SCNC: 3.9 MMOL/L (ref 3.5–5.1)
PROT SERPL-MCNC: 7.8 G/DL (ref 5.7–8.2)
RBC # BLD AUTO: 4.53 X10(6)UL
SODIUM SERPL-SCNC: 139 MMOL/L (ref 136–145)
WBC # BLD AUTO: 8.6 X10(3) UL (ref 4–11)

## 2025-01-29 PROCEDURE — 70450 CT HEAD/BRAIN W/O DYE: CPT | Performed by: NURSE PRACTITIONER

## 2025-01-29 PROCEDURE — 36415 COLL VENOUS BLD VENIPUNCTURE: CPT

## 2025-01-29 PROCEDURE — 80053 COMPREHEN METABOLIC PANEL: CPT

## 2025-01-29 PROCEDURE — 85025 COMPLETE CBC W/AUTO DIFF WBC: CPT

## 2025-01-29 PROCEDURE — 99213 OFFICE O/P EST LOW 20 MIN: CPT | Performed by: NURSE PRACTITIONER

## 2025-01-29 RX ORDER — AMLODIPINE BESYLATE 10 MG/1
5 TABLET ORAL DAILY
Qty: 90 TABLET | Refills: 0 | Status: SHIPPED | OUTPATIENT
Start: 2025-01-29 | End: 2025-01-29

## 2025-01-29 RX ORDER — AMLODIPINE BESYLATE 10 MG/1
5 TABLET ORAL DAILY
Qty: 90 TABLET | Refills: 0 | Status: SHIPPED | OUTPATIENT
Start: 2025-01-29

## 2025-01-29 NOTE — TELEPHONE ENCOUNTER
Action Requested: Summary for Provider     []  Critical Lab, Recommendations Needed  [] Need Additional Advice  []   FYI    []   Need Orders  [] Need Medications Sent to Pharmacy  []  Other     SUMMARY: Per protocol advised : Office visit   Future Appointments   Date Time Provider Department Center   1/30/2025  9:00 AM Fany Morrissey MD [facility] [place]   2/20/2025  2:40 PM [facility]       Reason for call: Elevated BP  Onset: Data Unavailable    Call transferred from Patient  due to elevated b/p   Patient calling ( name and date of birth of patient verified ) states having elevated blood pressure and has a headache, also having gout issues      Today b/p was 160/84   hr 69  @ 12:10pm    At 8am  162/86  Hr 89    Reviewed red flag symptoms to watch for of when to go to ER.     Advised to check b/p , feet flat in the floor , arm at heart level and when calm  Advised to bring b/p readings and machine to appointment tomorrow    Patient verbalizes understanding and agrees with plan.       Future Appointments   Date Time Provider Department Center   1/30/2025  9:00 AM [facility]   2/20/2025  2:40 PM [facility]       Reason for Disposition   Systolic BP >= 160 OR Diastolic >= 100    Protocols used: Blood Pressure - High-A-OH

## 2025-01-29 NOTE — TELEPHONE ENCOUNTER
PHARMACY COMMENTS  Amlodipine may inhibit the metabolism of Simvastatin    Current Outpatient Medications:       amLODIPine 10 MG Oral Tab, Take 0.5 tablets (5 mg total) by mouth daily., Disp: 90 tablet, Rfl: 0

## 2025-01-29 NOTE — TELEPHONE ENCOUNTER
Pharmacy requesting call back regarding Drug - Drug Warning / Interaction with amlodipine and simvastatin.

## 2025-01-29 NOTE — PROGRESS NOTES
Subjective:   Amberly Michael is a 84 year old female who presents for Follow - Up (Pt. States that she checked her blood pressure this morning 162/84, this afternoon at 12:10 160/89, then at 1:20pm 191/87, then at 1:40 205/100. Pt. States that she has intermittent pressure on right back side of head for the past week. Denies any chest pain. )     Amberly presents accompanied by her daughter for evaluation of elevated blood pressure at home today.  She has been on her 2 blood pressure medications for the past at least 10 years with no recent changes.  States she used to be on amlodipine, that but that was discontinued 2 years ago.  Reports a pressure that is occurring in the back of her head that comes and goes over the past 7 days.  She checked her blood pressure at home because of the pressure in the back of her head.  BP at home was 160s over 80s.  Then she began to feel nervous about it, rechecked and it was 191/87 then 205/100.  It was recommended she come in for evaluation.    She has been having a left gout flare recently, has been taking Aleve.  Drinking 32 ounces of water daily for the past 2 weeks, which is very unusual for her (usually drinks weight loss).    She is not having any dizziness, weakness, vision changes, speech difficulties, numbness, tingling.      History/Other:    Chief Complaint Reviewed and Verified  Nursing Notes Reviewed and   Verified  Tobacco Reviewed  Allergies Reviewed  Medications Reviewed    Problem List Reviewed  Medical History Reviewed  Surgical History   Reviewed  OB Status Reviewed  Family History Reviewed  Social History   Reviewed         Tobacco:  She has never smoked tobacco.    Current Outpatient Medications   Medication Sig Dispense Refill    amLODIPine 10 MG Oral Tab Take 0.5 tablets (5 mg total) by mouth daily. 90 tablet 0    hydroCHLOROthiazide 25 MG Oral Tab Take 1 tablet (25 mg total) by mouth daily. 90 tablet 3    citalopram 10 MG Oral Tab Take 1 tablet  (10 mg total) by mouth daily. 90 tablet 3    Olmesartan Medoxomil 40 MG Oral Tab Take 1 tablet (40 mg total) by mouth daily. 90 tablet 3    simvastatin 40 MG Oral Tab Take 1 tablet (40 mg total) by mouth every evening. 90 tablet 3    Ascorbic Acid (VITAMIN C) 1000 MG Oral Tab Take 1 tablet (1,000 mg total) by mouth daily.      cholecalciferol 25 MCG (1000 UT) Oral Tab Take 1 tablet (1,000 Units total) by mouth daily.      Esomeprazole Magnesium 20 MG Oral Capsule Delayed Release Take 1 capsule (20 mg total) by mouth every morning before breakfast. 1 capsule 0         Review of Systems:  Review of Systems  10 point review of systems otherwise negative with the exception of HPI and assessment and plan.    Objective:   BP (!) 178/92   Pulse 80   Resp 18   Ht 4' 10\" (1.473 m)   Wt 178 lb (80.7 kg)   SpO2 98%   BMI 37.20 kg/m²  Estimated body mass index is 37.2 kg/m² as calculated from the following:    Height as of this encounter: 4' 10\" (1.473 m).    Weight as of this encounter: 178 lb (80.7 kg).      Physical Exam  Vitals reviewed.   Constitutional:       General: She is not in acute distress.     Appearance: She is not ill-appearing.   Eyes:      Extraocular Movements: Extraocular movements intact.      Pupils: Pupils are equal, round, and reactive to light.   Cardiovascular:      Rate and Rhythm: Normal rate and regular rhythm.      Pulses: Normal pulses.      Heart sounds: Normal heart sounds. No murmur heard.  Pulmonary:      Effort: Pulmonary effort is normal. No respiratory distress.      Breath sounds: Normal breath sounds.   Neurological:      General: No focal deficit present.      Mental Status: She is alert and oriented to person, place, and time.      Cranial Nerves: Cranial nerves 2-12 are intact.      Motor: Motor function is intact.      Coordination: Coordination is intact.      Gait: Gait is intact.   Psychiatric:         Mood and Affect: Mood is anxious.         Assessment & Plan:   1. Elevated  blood pressure reading in office with diagnosis of hypertension (Primary)  -     amLODIPine Besylate; Take 0.5 tablets (5 mg total) by mouth daily.  Dispense: 90 tablet; Refill: 0  -     CBC With Differential With Platelet; Future; Expected date: 01/29/2025  -     Comp Metabolic Panel (14); Future; Expected date: 01/29/2025  -     CT BRAIN OR HEAD (CPT=70450); Future; Expected date: 01/29/2025  - Given her persistently elevated blood pressure in the office, posterior head pressure x 1 week.  Will send for stat CT had some labs.  Her neuro exam in the office is grossly intact.  Suspect her anxiety is causing some of the increase in her blood pressure.  - If CT results are normal, instructed patient I will send her home and she can start taking amlodipine 5 mg daily.  She should check her blood pressure every day at least a couple of hours after taking all medications, and call the nurse line or send those readings to me on Friday of this week.  Dose adjustments can be made if necessary.  - Red flag symptoms discussed including speech difficulty, vision changes, dizziness, weakness, persistently elevated blood pressure above 180/100 she should report to the emergency room for evaluation.  2. Primary hypertension  -     amLODIPine Besylate; Take 0.5 tablets (5 mg total) by mouth daily.  Dispense: 90 tablet; Refill: 0  -     CBC With Differential With Platelet; Future; Expected date: 01/29/2025  -     Comp Metabolic Panel (14); Future; Expected date: 01/29/2025  - As above.  3. Pressure in head  -     CT BRAIN OR HEAD (CPT=70450); Future; Expected date: 01/29/2025  - As above.    ADDENDUM: called patient re: CT head results - negative for acute intracranial process. She had labs completed and is at the pharmacy pickup up amlodipine. Instructed to take a dose this evening and tomorrow morning. Has follow up with PMD already scheduled for tomorrow - instructed to keep this appointment so she can have her blood pressure  rechecked in the office.        Return for previously scheduled appointment with Dr. Morrissey tomorrow..    Lynette Smith, DENISE, 1/29/2025, 2:29 PM     This note was prepared using Dragon Medical voice recognition dictation software. As a result errors may occur. When identified, these errors have been corrected. While every attempt is made to correct errors during dictation discrepancies may still exist.

## 2025-01-29 NOTE — TELEPHONE ENCOUNTER
Verified name and .    Patient returning call requesting appointment with any provider today and blood pressure remains high- states that blood pressure is now 205/100.    She states that she does have headache but denies any chest pain, changes in vision, unsteady gait, or difficulty breathing at this time.    Appointment scheduled:  Future Appointments   Date Time Provider Department Center   2025  2:30 PM Lynette Smith APRN [facility] [place]   2025  9:00 AM Fany Morrissey MD [facility] [place]   2025  2:40 PM [facility]3 [facility]

## 2025-01-29 NOTE — PATIENT INSTRUCTIONS
- Proceed to the [facility], check in at the desk and let them know you are there for a CT scan of your head, and some blood work.  -You will remain in the waiting room until they call me with the results.  I will call you and let you know if you can leave.  -Please start amlodipine 5 mg daily.  Please check your blood pressure at least once daily a couple of times after you take your medications.  Write these down, and send them into the office on Friday via [id number] message.  Or you can call the nurse line and they will relay them to me.  -To ER with any symptoms we talked about such as worsening pain in your head, any weakness, dizziness, blood pressure that remains elevated above 190.

## 2025-01-30 ENCOUNTER — OFFICE VISIT (OUTPATIENT)
Dept: INTERNAL MEDICINE CLINIC | Facility: CLINIC | Age: 85
End: 2025-01-30

## 2025-01-30 VITALS
DIASTOLIC BLOOD PRESSURE: 85 MMHG | HEART RATE: 84 BPM | WEIGHT: 178 LBS | SYSTOLIC BLOOD PRESSURE: 160 MMHG | OXYGEN SATURATION: 95 % | BODY MASS INDEX: 37.36 KG/M2 | HEIGHT: 58 IN

## 2025-01-30 DIAGNOSIS — I10 PRIMARY HYPERTENSION: Primary | ICD-10-CM

## 2025-01-30 PROCEDURE — 99214 OFFICE O/P EST MOD 30 MIN: CPT | Performed by: INTERNAL MEDICINE

## 2025-01-30 RX ORDER — PYRIDOXINE HCL (VITAMIN B6) 25 MG
25 TABLET ORAL DAILY
COMMUNITY
Start: 2024-09-26

## 2025-01-30 NOTE — PROGRESS NOTES
Subjective:     Patient ID: Amberly Michael is a 84 year old female.    Hypertension    Gout        History/Other:   She came in today for follow-up on her blood pressure.  According to the patient her blood pressure is running on the higher side.  She states that lately she is very stressed out because she thinks that her mother  at age of 82 and she is 84 and she is concerned about that.  She was seen by different provider yesterday who started on amlodipine 5 mg  Last week she did have some pain on her big toe of her right foot  Review of Systems   Constitutional: Negative.    HENT: Negative.     Eyes: Negative.    Respiratory: Negative.     Cardiovascular: Negative.    Gastrointestinal: Negative.    Endocrine: Negative.    Genitourinary: Negative.    Musculoskeletal:  Positive for gout.   Skin: Negative.    Neurological: Negative.    Hematological: Negative.    Psychiatric/Behavioral: Negative.       Current Outpatient Medications   Medication Sig Dispense Refill    Pyridoxine HCl 25 MG Oral Tab Take 1 tablet (25 mg total) by mouth daily.      amLODIPine 10 MG Oral Tab Take 0.5 tablets (5 mg total) by mouth daily. 90 tablet 0    hydroCHLOROthiazide 25 MG Oral Tab Take 1 tablet (25 mg total) by mouth daily. 90 tablet 3    citalopram 10 MG Oral Tab Take 1 tablet (10 mg total) by mouth daily. 90 tablet 3    Olmesartan Medoxomil 40 MG Oral Tab Take 1 tablet (40 mg total) by mouth daily. 90 tablet 3    simvastatin 40 MG Oral Tab Take 1 tablet (40 mg total) by mouth every evening. 90 tablet 3    Ascorbic Acid (VITAMIN C) 1000 MG Oral Tab Take 1 tablet (1,000 mg total) by mouth daily.      cholecalciferol 25 MCG (1000 UT) Oral Tab Take 1 tablet (1,000 Units total) by mouth daily.      Esomeprazole Magnesium 20 MG Oral Capsule Delayed Release Take 1 capsule (20 mg total) by mouth every morning before breakfast. 1 capsule 0     Allergies:Allergies[1]    Past Medical History:    Anxiety    Bilateral carpal tunnel  syndrome    Current mild episode of major depressive disorder, unspecified whether recurrent    Dysthymic disorder    Essential hypertension    GERD (gastroesophageal reflux disease)    Hypercholesterolemia    Intertrigo    Peripheral edema      History reviewed. No pertinent surgical history.   Family History   Problem Relation Age of Onset    Heart Disease Father     Other (myelomas) Mother     No Known Problems Sister       Social History:   Social History     Socioeconomic History    Marital status:    Tobacco Use    Smoking status: Never    Smokeless tobacco: Never   Vaping Use    Vaping status: Never Used   Substance and Sexual Activity    Alcohol use: Never    Drug use: Not Currently     Social Drivers of Health      [facility]    Social Connections    [facility]    Housing Stability        Objective:   Physical Exam  Vitals and nursing note reviewed.   Constitutional:       Appearance: Normal appearance.   HENT:      Head: Normocephalic and atraumatic.   Cardiovascular:      Rate and Rhythm: Normal rate and regular rhythm.      Pulses: Normal pulses.      Heart sounds: Normal heart sounds.   Pulmonary:      Effort: Pulmonary effort is normal.      Breath sounds: Normal breath sounds.   Musculoskeletal:         General: Normal range of motion.      Cervical back: Normal range of motion and neck supple.   Skin:     General: Skin is warm and dry.   Neurological:      Mental Status: She is alert. Mental status is at baseline.         Assessment & Plan:   No diagnosis found.  Hypertension-blood pressure noted on the higher side I will increase her amlodipine to 10 mg daily continue with rest of medication monitor blood pressure at home and follow-up with blood pressure readings in 2 to 3 weeks  No orders of the defined types were placed in this encounter.      Meds This  Visit:  Requested Prescriptions      No prescriptions requested or ordered in this encounter       Imaging & Referrals:  None            [1] No Known Allergies

## 2025-02-11 ENCOUNTER — TELEPHONE (OUTPATIENT)
Facility: CLINIC | Age: 85
End: 2025-02-11

## 2025-02-11 DIAGNOSIS — I10 ELEVATED BLOOD PRESSURE READING IN OFFICE WITH DIAGNOSIS OF HYPERTENSION: ICD-10-CM

## 2025-02-11 DIAGNOSIS — I10 PRIMARY HYPERTENSION: ICD-10-CM

## 2025-02-11 RX ORDER — AMLODIPINE BESYLATE 10 MG/1
5 TABLET ORAL DAILY
Qty: 90 TABLET | Refills: 0 | Status: CANCELLED | OUTPATIENT
Start: 2025-02-11

## 2025-02-11 NOTE — TELEPHONE ENCOUNTER
Verified name and date of birth.   Please advice on amlodipine she is now taking 10 mg  daily  She only has enough until Friday     Per the patient is feeling good since last week Wednesday    Date  Blood pressure  Heart rate  2/5/24 7:15 am       135/58  71             9:00 pm        142/73                       71  2/7/25  7:15 am         128/58                      66              6:05 pm        136/85                      71  2/9/25   9:00 am         133/78                    71               6:00 pm         128/61                     85  2/11/25  8:00 pm         128/64                     71    The patient would like call back with how much should she take    I placed the blood pressures and heart rates in patient reported vital signs.  Amlodipine pended is for 5 mg daily.

## 2025-02-11 NOTE — TELEPHONE ENCOUNTER
Patient called to request refill, stated was originally told to take a half a pill, but then changed to taking a full pill so will run out on Friday, verified [facility] in [place].     Current Outpatient Medications   Medication Sig Dispense Refill    amLODIPine 10 MG Oral Tab Take 0.5 tablets (5 mg total) by mouth daily. 90 tablet 0

## 2025-02-11 NOTE — TELEPHONE ENCOUNTER
Spoke to the patient informing  her of drs msg below. Patient states understanding, denies having any further questions.

## 2025-02-18 ENCOUNTER — OFFICE VISIT (OUTPATIENT)
Dept: INTERNAL MEDICINE CLINIC | Facility: CLINIC | Age: 85
End: 2025-02-18
Payer: MEDICARE

## 2025-02-18 VITALS
OXYGEN SATURATION: 92 % | WEIGHT: 180.38 LBS | BODY MASS INDEX: 37.86 KG/M2 | HEART RATE: 97 BPM | DIASTOLIC BLOOD PRESSURE: 85 MMHG | SYSTOLIC BLOOD PRESSURE: 135 MMHG | HEIGHT: 58 IN

## 2025-02-18 DIAGNOSIS — I10 ELEVATED BLOOD PRESSURE READING IN OFFICE WITH DIAGNOSIS OF HYPERTENSION: ICD-10-CM

## 2025-02-18 DIAGNOSIS — I10 PRIMARY HYPERTENSION: Primary | ICD-10-CM

## 2025-02-18 PROCEDURE — 99213 OFFICE O/P EST LOW 20 MIN: CPT | Performed by: INTERNAL MEDICINE

## 2025-02-18 RX ORDER — AMLODIPINE BESYLATE 10 MG/1
10 TABLET ORAL DAILY
Qty: 90 TABLET | Refills: 1 | Status: SHIPPED | OUTPATIENT
Start: 2025-02-18

## 2025-02-18 NOTE — PROGRESS NOTES
Subjective:     Patient ID: Amberly Michael is a 84 year old female.    HPI    History/Other:   She came in today for follow-up on her blood pressure.  She is monitoring blood pressure at home and is much better controlled.  She is taking her medications regularly.  Review of Systems   Constitutional: Negative.    HENT: Negative.     Eyes: Negative.    Respiratory: Negative.     Cardiovascular: Negative.    Gastrointestinal: Negative.    Genitourinary: Negative.    Musculoskeletal: Negative.    Skin: Negative.    Neurological: Negative.    Hematological: Negative.    Psychiatric/Behavioral: Negative.       Current Outpatient Medications   Medication Sig Dispense Refill    amLODIPine 10 MG Oral Tab Take 0.5 tablets (5 mg total) by mouth daily. 90 tablet 1    Pyridoxine HCl 25 MG Oral Tab Take 1 tablet (25 mg total) by mouth daily.      hydroCHLOROthiazide 25 MG Oral Tab Take 1 tablet (25 mg total) by mouth daily. 90 tablet 3    citalopram 10 MG Oral Tab Take 1 tablet (10 mg total) by mouth daily. 90 tablet 3    Olmesartan Medoxomil 40 MG Oral Tab Take 1 tablet (40 mg total) by mouth daily. 90 tablet 3    simvastatin 40 MG Oral Tab Take 1 tablet (40 mg total) by mouth every evening. 90 tablet 3    Ascorbic Acid (VITAMIN C) 1000 MG Oral Tab Take 1 tablet (1,000 mg total) by mouth daily.      cholecalciferol 25 MCG (1000 UT) Oral Tab Take 1 tablet (1,000 Units total) by mouth daily.      Esomeprazole Magnesium 20 MG Oral Capsule Delayed Release Take 1 capsule (20 mg total) by mouth every morning before breakfast. 1 capsule 0     Allergies:Allergies[1]    Past Medical History:    Anxiety    Bilateral carpal tunnel syndrome    Current mild episode of major depressive disorder, unspecified whether recurrent    Dysthymic disorder    Essential hypertension    GERD (gastroesophageal reflux disease)    Hypercholesterolemia    Intertrigo    Peripheral edema      No past surgical history on file.   Family History   Problem  Relation Age of Onset    Heart Disease Father     Other (myelomas) Mother     No Known Problems Sister       Social History:   Social History     Socioeconomic History    Marital status:    Tobacco Use    Smoking status: Never    Smokeless tobacco: Never   Vaping Use    Vaping status: Never Used   Substance and Sexual Activity    Alcohol use: Never    Drug use: Not Currently     Social Drivers of Health      [facility]    Housing Stability        Objective:   Physical Exam  Vitals and nursing note reviewed.   Constitutional:       Appearance: Normal appearance.   HENT:      Head: Normocephalic and atraumatic.   Cardiovascular:      Rate and Rhythm: Normal rate and regular rhythm.      Pulses: Normal pulses.      Heart sounds: Normal heart sounds.   Pulmonary:      Effort: Pulmonary effort is normal.      Breath sounds: Normal breath sounds.   Musculoskeletal:         General: Normal range of motion.      Cervical back: Normal range of motion and neck supple.   Skin:     General: Skin is warm and dry.   Neurological:      Mental Status: She is alert. Mental status is at baseline.   Psychiatric:         Mood and Affect: Mood normal.         Assessment & Plan:   No diagnosis found.  Hypertension controlled continue with current medication, watch diet avoid salty food  No orders of the defined types were placed in this encounter.      Meds This Visit:  Requested Prescriptions      No prescriptions requested or ordered in this encounter       Imaging & Referrals:  None            [1] No Known Allergies

## 2025-02-20 ENCOUNTER — HOSPITAL ENCOUNTER (OUTPATIENT)
Dept: MAMMOGRAPHY | Facility: HOSPITAL | Age: 85
Discharge: HOME | End: 2025-02-20
Attending: INTERNAL MEDICINE
Payer: MEDICARE

## 2025-02-20 DIAGNOSIS — Z12.31 ENCOUNTER FOR SCREENING MAMMOGRAM FOR MALIGNANT NEOPLASM OF BREAST: ICD-10-CM

## 2025-02-20 PROCEDURE — 77063 BREAST TOMOSYNTHESIS BI: CPT | Performed by: INTERNAL MEDICINE

## 2025-02-20 PROCEDURE — 77067 SCR MAMMO BI INCL CAD: CPT | Performed by: INTERNAL MEDICINE

## 2025-02-28 RX ORDER — SIMVASTATIN 40 MG
40 TABLET ORAL EVERY EVENING
Qty: 90 TABLET | Refills: 3 | Status: SHIPPED | OUTPATIENT
Start: 2025-02-28

## 2025-02-28 NOTE — TELEPHONE ENCOUNTER
Refill passed per Clinic protocol.  Requested Prescriptions   Pending Prescriptions Disp Refills    SIMVASTATIN 40 MG Oral Tab [Pharmacy Med Name: SIMVASTATIN 40 MG TABLET] 90 tablet 3     Sig: TAKE ONE TABLET BY MOUTH EVERY EVENING       Cholesterol Medication Protocol Passed - 2/28/2025  3:21 PM        Passed - ALT < 80     Lab Results   Component Value Date    ALT 14 01/29/2025             Passed - ALT resulted within past year        Passed - Lipid panel within past 12 months     Lab Results   Component Value Date    CHOLEST 184 09/19/2024    TRIG 143 09/19/2024    HDL 63 (H) 09/19/2024    LDL 96 09/19/2024    VLDL 24 09/19/2024    NONHDLC 121 09/19/2024             Passed - In person appointment or virtual visit in the past 12 mos or appointment in next 3 mos     Recent Outpatient Visits              1 week ago Primary hypertension    [facility], Hillary Lombardi Arlinda, MD    Office Visit    4 weeks ago [facility] [place] [facility]    Office Visit    1 month ago Elevated blood pressure reading in office with diagnosis of hypertension    [facility]    Office Visit    5 months ago Primary hypertension    [facility], Hillary Lombardi Arlinda, MD    Office Visit    5 months ago Medicare annual wellness visit, subsequent    [facility] [place] [facility]    Office Visit          Future Appointments         Provider Department Appt Notes    In 1 month Fany Morrissey MD [facility], [place], [place] [facility] follow-up                    Passed - Medication is active on med list

## 2025-03-03 ENCOUNTER — OFFICE VISIT (OUTPATIENT)
Dept: INTERNAL MEDICINE CLINIC | Facility: CLINIC | Age: 85
End: 2025-03-03

## 2025-03-03 ENCOUNTER — HOSPITAL ENCOUNTER (OUTPATIENT)
Dept: ULTRASOUND IMAGING | Facility: HOSPITAL | Age: 85
Discharge: HOME | End: 2025-03-03
Attending: INTERNAL MEDICINE
Payer: MEDICARE

## 2025-03-03 ENCOUNTER — TELEPHONE (OUTPATIENT)
Dept: INTERNAL MEDICINE CLINIC | Facility: CLINIC | Age: 85
End: 2025-03-03

## 2025-03-03 VITALS
HEIGHT: 58 IN | WEIGHT: 182 LBS | DIASTOLIC BLOOD PRESSURE: 80 MMHG | SYSTOLIC BLOOD PRESSURE: 150 MMHG | HEART RATE: 92 BPM | BODY MASS INDEX: 38.2 KG/M2 | TEMPERATURE: 97 F

## 2025-03-03 DIAGNOSIS — M79.89 PAIN AND SWELLING OF LOWER LEG, UNSPECIFIED LATERALITY: Primary | ICD-10-CM

## 2025-03-03 DIAGNOSIS — R06.02 SOB (SHORTNESS OF BREATH) ON EXERTION: ICD-10-CM

## 2025-03-03 DIAGNOSIS — M79.89 PAIN AND SWELLING OF LOWER LEG, UNSPECIFIED LATERALITY: ICD-10-CM

## 2025-03-03 DIAGNOSIS — M79.669 PAIN AND SWELLING OF LOWER LEG, UNSPECIFIED LATERALITY: ICD-10-CM

## 2025-03-03 DIAGNOSIS — M79.669 PAIN AND SWELLING OF LOWER LEG, UNSPECIFIED LATERALITY: Primary | ICD-10-CM

## 2025-03-03 PROCEDURE — 99214 OFFICE O/P EST MOD 30 MIN: CPT | Performed by: INTERNAL MEDICINE

## 2025-03-03 PROCEDURE — 93970 EXTREMITY STUDY: CPT | Performed by: INTERNAL MEDICINE

## 2025-03-03 RX ORDER — FUROSEMIDE 20 MG/1
20 TABLET ORAL DAILY
Qty: 30 TABLET | Refills: 0 | Status: SHIPPED | OUTPATIENT
Start: 2025-03-03

## 2025-03-03 NOTE — TELEPHONE ENCOUNTER
Verified name and .    Patient states that over the past 2 weeks she has noticed swelling in bilateral lower extremities from feet into calves.    She states that she noticed some minimal swelling a few days after starting Amlodipine but that the swelling has increased.    She states she is able to walk and denies and difficulty breathing.    She states that her blood pressures have been stable ranging in the 120's-130's/60/s-70's.    Appointment scheduled for follow up evaluation today.    Future Appointments   Date Time Provider Department Center   3/3/2025  2:15 PM Fany Morrissey MD [id number] [place] [id number]   2025  9:30 AM Fany Morrissey MD [facility]     Information provided:  [address] [place] [address]

## 2025-03-04 NOTE — PROGRESS NOTES
Subjective:     Patient ID: Amberly Michael is a 84 year old female.    Swelling        History/Other:   She came in today complaining of swelling of her legs .According to her it started since February 20 progressively is getting worse.  Tender to touch.  There is no redness.   Started from the ankle and spread up to knee  Review of Systems   Constitutional: Negative.    HENT: Negative.     Eyes: Negative.    Respiratory: Negative.     Cardiovascular:  Positive for leg swelling.   Gastrointestinal: Negative.    Musculoskeletal: Negative.    Skin: Negative.    Neurological: Negative.    Hematological: Negative.    Psychiatric/Behavioral: Negative.       Current Outpatient Medications   Medication Sig Dispense Refill    furosemide 20 MG Oral Tab Take 1 tablet (20 mg total) by mouth daily. 30 tablet 0    simvastatin 40 MG Oral Tab Take 1 tablet (40 mg total) by mouth every evening. 90 tablet 3    amLODIPine 10 MG Oral Tab Take 1 tablet (10 mg total) by mouth daily. 90 tablet 1    Pyridoxine HCl 25 MG Oral Tab Take 1 tablet (25 mg total) by mouth daily.      hydroCHLOROthiazide 25 MG Oral Tab Take 1 tablet (25 mg total) by mouth daily. 90 tablet 3    citalopram 10 MG Oral Tab Take 1 tablet (10 mg total) by mouth daily. 90 tablet 3    Olmesartan Medoxomil 40 MG Oral Tab Take 1 tablet (40 mg total) by mouth daily. 90 tablet 3    Ascorbic Acid (VITAMIN C) 1000 MG Oral Tab Take 1 tablet (1,000 mg total) by mouth daily.      cholecalciferol 25 MCG (1000 UT) Oral Tab Take 1 tablet (1,000 Units total) by mouth daily.      Esomeprazole Magnesium 20 MG Oral Capsule Delayed Release Take 1 capsule (20 mg total) by mouth every morning before breakfast. 1 capsule 0     Allergies:Allergies[1]    Past Medical History:    Anxiety    Bilateral carpal tunnel syndrome    Current mild episode of major depressive disorder, unspecified whether recurrent    Dysthymic disorder    Essential hypertension    GERD (gastroesophageal reflux disease)     Hypercholesterolemia    Intertrigo    Peripheral edema      No past surgical history on file.   Family History   Problem Relation Age of Onset    Heart Disease Father     Other (myelomas) Mother     No Known Problems Sister       Social History:   Social History     Socioeconomic History    Marital status:    Tobacco Use    Smoking status: Never    Smokeless tobacco: Never   Vaping Use    Vaping status: Never Used   Substance and Sexual Activity    Alcohol use: Never    Drug use: Not Currently     Social Drivers of Health      [facility]    Housing Stability        Objective:   Physical Exam  Vitals and nursing note reviewed.   Constitutional:       Appearance: Normal appearance.   HENT:      Head: Normocephalic and atraumatic.   Cardiovascular:      Rate and Rhythm: Normal rate and regular rhythm.      Pulses: Normal pulses.      Heart sounds: Normal heart sounds.   Pulmonary:      Effort: Pulmonary effort is normal.      Breath sounds: Normal breath sounds.   Musculoskeletal:         General: Swelling present.      Cervical back: Normal range of motion and neck supple.      Comments: 2+ edema   Skin:     General: Skin is warm and dry.   Neurological:      Mental Status: She is alert. Mental status is at baseline.   Psychiatric:         Mood and Affect: Mood normal.         Assessment & Plan:   B/l leg swelling -discontinue amlodipine and hydrochlorothiazide start Lasix 20 mg once a day for 5 days, will order 2D echo, ultrasound venous Doppler, follow-up i 5 days    htn blood pressure noted on the higher side continue with Lasix and the rest of medication monitor blood pressure follow-up Friday      No orders of the defined types were placed in this encounter.      Meds This Visit:  Requested Prescriptions     Signed Prescriptions Disp Refills    furosemide 20 MG Oral Tab 30 tablet 0     Sig: Take 1 tablet (20 mg total) by mouth daily.        Imaging & Referrals:  [facility] ECHO 2D DOPPLER (CPT=93306)            [1] No Known Allergies

## 2025-03-07 ENCOUNTER — OFFICE VISIT (OUTPATIENT)
Dept: INTERNAL MEDICINE CLINIC | Facility: CLINIC | Age: 85
End: 2025-03-07

## 2025-03-07 VITALS
HEART RATE: 72 BPM | WEIGHT: 179.19 LBS | HEIGHT: 58 IN | RESPIRATION RATE: 18 BRPM | OXYGEN SATURATION: 95 % | SYSTOLIC BLOOD PRESSURE: 135 MMHG | BODY MASS INDEX: 37.61 KG/M2 | DIASTOLIC BLOOD PRESSURE: 70 MMHG

## 2025-03-07 DIAGNOSIS — I10 PRIMARY HYPERTENSION: Primary | ICD-10-CM

## 2025-03-07 PROCEDURE — 99214 OFFICE O/P EST MOD 30 MIN: CPT | Performed by: INTERNAL MEDICINE

## 2025-03-07 NOTE — PROGRESS NOTES
Subjective:     Patient ID: Amberly Michael is a 84 year old female.    HPI    History/Other:   Review of Systems   Constitutional: Negative.    HENT: Negative.     Respiratory: Negative.     Cardiovascular: Negative.    Genitourinary: Negative.    Musculoskeletal: Negative.    Hematological: Negative.    Psychiatric/Behavioral: Negative.       Current Outpatient Medications   Medication Sig Dispense Refill    furosemide 20 MG Oral Tab Take 1 tablet (20 mg total) by mouth daily. 30 tablet 0    simvastatin 40 MG Oral Tab Take 1 tablet (40 mg total) by mouth every evening. 90 tablet 3    Pyridoxine HCl 25 MG Oral Tab Take 1 tablet (25 mg total) by mouth daily.      hydroCHLOROthiazide 25 MG Oral Tab Take 1 tablet (25 mg total) by mouth daily. 90 tablet 3    citalopram 10 MG Oral Tab Take 1 tablet (10 mg total) by mouth daily. 90 tablet 3    Olmesartan Medoxomil 40 MG Oral Tab Take 1 tablet (40 mg total) by mouth daily. 90 tablet 3    Ascorbic Acid (VITAMIN C) 1000 MG Oral Tab Take 1 tablet (1,000 mg total) by mouth daily.      cholecalciferol 25 MCG (1000 UT) Oral Tab Take 1 tablet (1,000 Units total) by mouth daily.      Esomeprazole Magnesium 20 MG Oral Capsule Delayed Release Take 1 capsule (20 mg total) by mouth every morning before breakfast. 1 capsule 0     Allergies:Allergies[1]    Past Medical History:    Anxiety    Bilateral carpal tunnel syndrome    Current mild episode of major depressive disorder, unspecified whether recurrent    Dysthymic disorder    Essential hypertension    GERD (gastroesophageal reflux disease)    Hypercholesterolemia    Intertrigo    Peripheral edema      History reviewed. No pertinent surgical history.   Family History   Problem Relation Age of Onset    Heart Disease Father     Other (myelomas) Mother     No Known Problems Sister       Social History:   Social History     Socioeconomic History    Marital status:    Tobacco Use    Smoking status: Never    Smokeless tobacco:  Never   Vaping Use    Vaping status: Never Used   Substance and Sexual Activity    Alcohol use: Never    Drug use: Not Currently     Social Drivers of Health      Received from [facility], [facility]    Housing Stability        Objective:   Physical Exam  Vitals and nursing note reviewed.   Constitutional:       Appearance: Normal appearance.   HENT:      Head: Normocephalic and atraumatic.   Cardiovascular:      Rate and Rhythm: Normal rate and regular rhythm.      Pulses: Normal pulses.      Heart sounds: Normal heart sounds.   Pulmonary:      Effort: Pulmonary effort is normal.      Breath sounds: Normal breath sounds.   Abdominal:      Palpations: Abdomen is soft.   Musculoskeletal:         General: Normal range of motion.      Cervical back: Normal range of motion and neck supple.   Skin:     General: Skin is warm.   Neurological:      Mental Status: She is alert. Mental status is at baseline.   Psychiatric:         Mood and Affect: Mood normal.         Assessment & Plan:   1. Primary hypertension    Controlled, continue with current medication  2 leg swelling /venous doppler negative for dvt, pending 2 d echo   Orders Placed This Encounter   Procedures    Basic Metabolic Panel (8) [E]       Meds This Visit:  Requested Prescriptions      No prescriptions requested or ordered in this encounter       Imaging & Referrals:  None            [1] No Known Allergies

## 2025-03-15 ENCOUNTER — HOSPITAL ENCOUNTER (OUTPATIENT)
Dept: CV DIAGNOSTICS | Facility: HOSPITAL | Age: 85
Discharge: HOME | End: 2025-03-15
Attending: INTERNAL MEDICINE
Payer: MEDICARE

## 2025-03-15 DIAGNOSIS — R06.02 SOB (SHORTNESS OF BREATH) ON EXERTION: ICD-10-CM

## 2025-03-15 PROCEDURE — 93306 TTE W/DOPPLER COMPLETE: CPT | Performed by: INTERNAL MEDICINE

## 2025-03-20 ENCOUNTER — OFFICE VISIT (OUTPATIENT)
Dept: INTERNAL MEDICINE CLINIC | Facility: CLINIC | Age: 85
End: 2025-03-20

## 2025-03-20 VITALS
DIASTOLIC BLOOD PRESSURE: 78 MMHG | OXYGEN SATURATION: 97 % | HEIGHT: 58 IN | SYSTOLIC BLOOD PRESSURE: 130 MMHG | BODY MASS INDEX: 37.57 KG/M2 | HEART RATE: 76 BPM | WEIGHT: 179 LBS

## 2025-03-20 DIAGNOSIS — I37.1 NONRHEUMATIC PULMONARY VALVE INSUFFICIENCY: Primary | ICD-10-CM

## 2025-03-20 PROCEDURE — 99214 OFFICE O/P EST MOD 30 MIN: CPT | Performed by: INTERNAL MEDICINE

## 2025-03-20 NOTE — PROGRESS NOTES
Subjective:     Patient ID: Amberly Michael is a 85 year old female.    Hypertension        History/Other:   She came in today for follow-up on blood pressure.  She is monitoring her blood pressure at home and is well-controlled.  She is taking her medications regularly.    She also wants to review the echo results  Review of Systems   Constitutional: Negative.    HENT: Negative.     Eyes: Negative.    Respiratory: Negative.     Cardiovascular: Negative.    Gastrointestinal: Negative.    Genitourinary: Negative.    Musculoskeletal: Negative.    Skin: Negative.    Neurological: Negative.    Hematological: Negative.    Psychiatric/Behavioral: Negative.       Current Outpatient Medications   Medication Sig Dispense Refill    furosemide 20 MG Oral Tab Take 1 tablet (20 mg total) by mouth daily. 30 tablet 0    simvastatin 40 MG Oral Tab Take 1 tablet (40 mg total) by mouth every evening. 90 tablet 3    Pyridoxine HCl 25 MG Oral Tab Take 1 tablet (25 mg total) by mouth daily.      hydroCHLOROthiazide 25 MG Oral Tab Take 1 tablet (25 mg total) by mouth daily. 90 tablet 3    citalopram 10 MG Oral Tab Take 1 tablet (10 mg total) by mouth daily. 90 tablet 3    Olmesartan Medoxomil 40 MG Oral Tab Take 1 tablet (40 mg total) by mouth daily. 90 tablet 3    Ascorbic Acid (VITAMIN C) 1000 MG Oral Tab Take 1 tablet (1,000 mg total) by mouth daily.      cholecalciferol 25 MCG (1000 UT) Oral Tab Take 1 tablet (1,000 Units total) by mouth daily.      Esomeprazole Magnesium 20 MG Oral Capsule Delayed Release Take 1 capsule (20 mg total) by mouth every morning before breakfast. 1 capsule 0     Allergies:Allergies[1]    Past Medical History:    Anxiety    Bilateral carpal tunnel syndrome    Current mild episode of major depressive disorder, unspecified whether recurrent    Dysthymic disorder    Essential hypertension    GERD (gastroesophageal reflux disease)    Hypercholesterolemia    Intertrigo    Peripheral edema      No past surgical  history on file.   Family History   Problem Relation Age of Onset    Heart Disease Father     Other (myelomas) Mother     No Known Problems Sister       Social History:   Social History     Socioeconomic History    Marital status:    Tobacco Use    Smoking status: Never    Smokeless tobacco: Never   Vaping Use    Vaping status: Never Used   Substance and Sexual Activity    Alcohol use: Never    Drug use: Not Currently     Social Drivers of Health      Received from [facility], [facility]    Housing Stability        Objective:   Physical Exam  Vitals and nursing note reviewed.   Constitutional:       Appearance: Normal appearance.   HENT:      Head: Normocephalic and atraumatic.   Cardiovascular:      Rate and Rhythm: Normal rate and regular rhythm.      Pulses: Normal pulses.      Heart sounds: Normal heart sounds.   Pulmonary:      Effort: Pulmonary effort is normal.      Breath sounds: Normal breath sounds.   Abdominal:      Palpations: Abdomen is soft.   Musculoskeletal:         General: Normal range of motion.      Cervical back: Normal range of motion and neck supple.   Skin:     General: Skin is warm.   Neurological:      Mental Status: She is alert. Mental status is at baseline.         Assessment & Plan:   1. Nonrheumatic pulmonary valve insufficiency    I will refer her to see cardiology  2 hypertension controlled continue with current medication    No orders of the defined types were placed in this encounter.      Meds This Visit:  Requested Prescriptions      No prescriptions requested or ordered in this encounter       Imaging & Referrals:  [facility] - INTERNAL            [1] No Known Allergies

## 2025-03-28 ENCOUNTER — TELEPHONE (OUTPATIENT)
Dept: INTERNAL MEDICINE CLINIC | Facility: CLINIC | Age: 85
End: 2025-03-28

## 2025-04-01 RX ORDER — OLMESARTAN MEDOXOMIL 40 MG/1
40 TABLET ORAL DAILY
Qty: 90 TABLET | Refills: 3 | Status: SHIPPED | OUTPATIENT
Start: 2025-04-01

## 2025-04-01 NOTE — TELEPHONE ENCOUNTER
Refill passed per [facility] protocol.    Per Office visit notes from 03/20/2025:  Assessment & Plan:  1. Nonrheumatic pulmonary valve insufficiency    I will refer her to see cardiology  2 hypertension controlled continue with current medication

## 2025-04-01 NOTE — TELEPHONE ENCOUNTER
Refill passed per [facility] protocol.    Dr. Morrissey, Please kindly review if is appropriate to send refills to pharmacy.   Per Office visit notes from 03/03/2025:  Assessment & Plan:  B/l leg swelling -discontinue amlodipine and hydrochlorothiazide start Lasix 20 mg once a day for 5 days, will order 2D echo, ultrasound venous Doppler, follow-up i 5 days     htn blood pressure noted on the higher side continue with Lasix and the rest of medication monitor blood pressure follow-up Friday

## 2025-04-02 RX ORDER — FUROSEMIDE 20 MG/1
20 TABLET ORAL DAILY
Qty: 30 TABLET | Refills: 0 | OUTPATIENT
Start: 2025-04-02

## 2025-04-02 NOTE — TELEPHONE ENCOUNTER
Patient returend call.  She confirms that she is no longer taking furosemide and has resumed hydrochlorothiazide.  Refill declined and discontinued with asterisk reason  to pharmacy.

## 2025-05-09 RX ORDER — CITALOPRAM HYDROBROMIDE 10 MG/1
10 TABLET ORAL DAILY
Qty: 90 TABLET | Refills: 3 | Status: SHIPPED | OUTPATIENT
Start: 2025-05-09

## 2025-07-14 RX ORDER — HYDROCHLOROTHIAZIDE 25 MG/1
25 TABLET ORAL DAILY
Qty: 90 TABLET | Refills: 3 | Status: SHIPPED | OUTPATIENT
Start: 2025-07-14

## 2025-07-15 NOTE — TELEPHONE ENCOUNTER
Refill Passed Per Protocol    Requested Prescriptions   Pending Prescriptions Disp Refills    HYDROCHLOROTHIAZIDE 25 MG Oral Tab [Pharmacy Med Name: hydroCHLOROthiazide 25 MG TABLET] 90 tablet 3     Sig: TAKE 1 TABLET BY MOUTH DAILY       Hypertension Medications Protocol Passed - 7/14/2025 11:09 PM        Passed - CMP or BMP in past 12 months        Passed - Last BP reading less than 140/90     BP Readings from Last 1 Encounters:   03/20/25 130/78               Passed - In person appointment or virtual visit in the past 12 mos or appointment in next 3 mos     Recent Outpatient Visits              3 months ago Nonrheumatic pulmonary valve insufficiency    [place], Hillary Lombardi Arlinda, MD    Office Visit    4 months ago Primary hypertension    [place], Hillary Lombardi Arlinda, MD    Office Visit    4 months ago Pain and swelling of lower leg, unspecified laterality    [facility] Fany Morrissey MD    Office Visit    4 months ago Primary hypertension    [place], Hillary Lombardi Arlinda, MD    Office Visit    5 months ago Primary hypertension    [place], Hillary Lombardi Arlinda, MD    Office Visit          Future Appointments         Provider Department Appt Notes    In 2 months Fany Morrissey MD [place], [place], [place] 6 month F/U                    Passed - EGFRCR or GFRNAA > 50     GFR Evaluation  EGFRCR: 58 , resulted on 1/29/2025          Passed - Medication is active on med list             Future Appointments         Provider Department Appt Notes    In 2 months Fany Morrissey MD [place], [place], [place] 6 month F/U          Recent Outpatient Visits              3 months ago Nonrheumatic pulmonary valve insufficiency    [facility], Hillary Lombardi Arlinda, MD    Office Visit    4 months ago Primary hypertension    [facility], Hillary Lombardi Arlinda, MD    Office Visit    4 months ago Pain and swelling of lower leg, unspecified laterality    [facility], [facility] Fany Morrissey MD    Office Visit    4 months ago Primary hypertension    [facility], Hillary Lombardi Arlinda, MD    Office Visit    5 months ago Primary hypertension    [facility], Hillary Lombardi Arlinda, MD    Office Visit

## (undated) NOTE — LETTER
Patient Name: Pat Arellano  YOB: 1940          MRN :  [id number]  Date:  12/3/2020  Referring Physician:  Rosalio Stringer    Discharge Summary  Pt has attended 12 visits in Physical Therapy.      Dx: Acute right-sided low back pain with sciatic